# Patient Record
Sex: MALE | Race: WHITE | NOT HISPANIC OR LATINO | URBAN - METROPOLITAN AREA
[De-identification: names, ages, dates, MRNs, and addresses within clinical notes are randomized per-mention and may not be internally consistent; named-entity substitution may affect disease eponyms.]

---

## 2017-03-22 ENCOUNTER — APPOINTMENT (RX ONLY)
Dept: URBAN - METROPOLITAN AREA CLINIC 349 | Facility: CLINIC | Age: 57
Setting detail: DERMATOLOGY
End: 2017-03-22

## 2017-03-22 DIAGNOSIS — L29.89 OTHER PRURITUS: ICD-10-CM

## 2017-03-22 DIAGNOSIS — L259 CONTACT DERMATITIS AND OTHER ECZEMA, UNSPECIFIED CAUSE: ICD-10-CM

## 2017-03-22 DIAGNOSIS — L29.8 OTHER PRURITUS: ICD-10-CM

## 2017-03-22 PROBLEM — L30.8 OTHER SPECIFIED DERMATITIS: Status: ACTIVE | Noted: 2017-03-22

## 2017-03-22 PROCEDURE — ? TREATMENT REGIMEN

## 2017-03-22 PROCEDURE — ? INTRAMUSCULAR KENALOG

## 2017-03-22 PROCEDURE — 99202 OFFICE O/P NEW SF 15 MIN: CPT | Mod: 25

## 2017-03-22 PROCEDURE — 96372 THER/PROPH/DIAG INJ SC/IM: CPT

## 2017-03-22 PROCEDURE — ? PRESCRIPTION

## 2017-03-22 PROCEDURE — ? COUNSELING

## 2017-03-22 RX ORDER — TRIAMCINOLONE ACETONIDE 1 MG/G
CREAM TOPICAL BID
Qty: 1 | Refills: 1 | Status: ERX | COMMUNITY
Start: 2017-03-22

## 2017-03-22 RX ADMIN — TRIAMCINOLONE ACETONIDE: 1 CREAM TOPICAL at 18:11

## 2017-03-22 ASSESSMENT — LOCATION DETAILED DESCRIPTION DERM
LOCATION DETAILED: RIGHT BUTTOCK
LOCATION DETAILED: RIGHT LATERAL SUPERIOR CHEST
LOCATION DETAILED: LEFT ANTERIOR PROXIMAL UPPER ARM
LOCATION DETAILED: RIGHT CLAVICULAR SKIN
LOCATION DETAILED: LEFT ANTERIOR SHOULDER
LOCATION DETAILED: RIGHT ANTERIOR PROXIMAL UPPER ARM
LOCATION DETAILED: LEFT LATERAL SUPERIOR CHEST
LOCATION DETAILED: LEFT POSTERIOR SHOULDER
LOCATION DETAILED: RIGHT POSTERIOR SHOULDER
LOCATION DETAILED: RIGHT ANTERIOR SHOULDER

## 2017-03-22 ASSESSMENT — LOCATION SIMPLE DESCRIPTION DERM
LOCATION SIMPLE: LEFT SHOULDER
LOCATION SIMPLE: RIGHT BUTTOCK
LOCATION SIMPLE: CHEST
LOCATION SIMPLE: RIGHT UPPER ARM
LOCATION SIMPLE: RIGHT CLAVICULAR SKIN
LOCATION SIMPLE: RIGHT SHOULDER
LOCATION SIMPLE: LEFT UPPER ARM

## 2017-03-22 ASSESSMENT — LOCATION ZONE DERM
LOCATION ZONE: TRUNK
LOCATION ZONE: ARM

## 2017-03-22 NOTE — PROCEDURE: TREATMENT REGIMEN
Plan: Topicals will help
Detail Level: Detailed
Initiate Treatment: Aveeno skin relief cleanser for bathing, Aveeno doing relief moisturizer after bathing, TMC to apply to rash twice daily

## 2017-05-24 ENCOUNTER — APPOINTMENT (RX ONLY)
Dept: URBAN - METROPOLITAN AREA CLINIC 349 | Facility: CLINIC | Age: 57
Setting detail: DERMATOLOGY
End: 2017-05-24

## 2017-05-24 DIAGNOSIS — L259 CONTACT DERMATITIS AND OTHER ECZEMA, UNSPECIFIED CAUSE: ICD-10-CM | Status: RESOLVED

## 2017-05-24 PROBLEM — H91.90 UNSPECIFIED HEARING LOSS, UNSPECIFIED EAR: Status: ACTIVE | Noted: 2017-05-24

## 2017-05-24 PROBLEM — L30.8 OTHER SPECIFIED DERMATITIS: Status: ACTIVE | Noted: 2017-05-24

## 2017-05-24 PROBLEM — F41.9 ANXIETY DISORDER, UNSPECIFIED: Status: ACTIVE | Noted: 2017-05-24

## 2017-05-24 PROCEDURE — ? RECOMMENDATIONS

## 2017-05-24 PROCEDURE — 99213 OFFICE O/P EST LOW 20 MIN: CPT

## 2017-05-24 PROCEDURE — ? COUNSELING

## 2017-05-24 ASSESSMENT — LOCATION DETAILED DESCRIPTION DERM
LOCATION DETAILED: RIGHT VENTRAL PROXIMAL FOREARM
LOCATION DETAILED: LEFT VENTRAL PROXIMAL FOREARM

## 2017-05-24 ASSESSMENT — LOCATION ZONE DERM: LOCATION ZONE: ARM

## 2017-05-24 ASSESSMENT — LOCATION SIMPLE DESCRIPTION DERM
LOCATION SIMPLE: LEFT FOREARM
LOCATION SIMPLE: RIGHT FOREARM

## 2017-05-24 NOTE — PROCEDURE: RECOMMENDATIONS
Detail Level: Zone
Recommendations (Free Text): If flares again start Triamcinolone cream and use three times daily x 2 wks then as needed\\nCall if gets out of hand

## 2017-06-06 PROBLEM — F33.1 MODERATE EPISODE OF RECURRENT MAJOR DEPRESSIVE DISORDER (HCC): Status: ACTIVE | Noted: 2017-06-06

## 2017-09-06 PROBLEM — F33.40 RECURRENT MAJOR DEPRESSIVE DISORDER, IN REMISSION (HCC): Status: ACTIVE | Noted: 2017-09-06

## 2017-09-06 PROBLEM — F41.1 GENERALIZED ANXIETY DISORDER: Status: ACTIVE | Noted: 2017-09-06

## 2017-10-26 ENCOUNTER — TELEPHONE (OUTPATIENT)
Dept: NUTRITION | Age: 57
End: 2017-10-26

## 2017-10-30 ENCOUNTER — HOSPITAL ENCOUNTER (OUTPATIENT)
Dept: NUTRITION | Age: 57
Discharge: HOME | End: 2017-10-30
Attending: FAMILY MEDICINE
Payer: MEDICARE

## 2017-10-30 PROCEDURE — 97802 MEDICAL NUTRITION INDIV IN: CPT

## 2017-10-30 NOTE — PROGRESS NOTES
NUTRITION ASSESSMENT:    FOOD/NUTRITION HISTORY:   Pt eats 3 meals/day with snacks (fruit and sugar-free candies noted). Pt recalls making attempts to follow a diet that he believes will help control his diabetes. Diet recall reveals 75-100gm carbohydrates estimated intake/day. Diet appears low in calcium-containing choices, low in servings of fruits and vegetables, low in fiber,adequate in protein. Unable to determine kcal level d/t portions not noted on food recall. Diet appears low in heart-healthy fats. Pt dines out 3-4 days/week. Noted for grilled chicken, low to no-carbs, and salads. Pt drinks 24 oz water/day, 12oz sweetened tea/day, and 2 (12oz) cups of milk/day. Alcohol intake noted @ none. Pt has an exercise regime of walking for 45 minutes/day after eating in the morning. Pt appears able to obtain appropriate nutritional choices as desired. Support:  Pt is  and wife may provide some support. Pt states wife cooks meals for pt. Barriers:  Pt appears to restrict carbohydrates well below allowances. Pt may not have adequate nutrition education regarding diabetic diet guidelines. Motivations for change:  Pt states that he is concerned when he has low blood sugar moments (hypoglycemia) including during driving at times. Would like education to prevent episodes. Current Medical Diagnosis:   Hypoglycemia    History of Current Illness:   Pt is referred by Dr. Ellen Junior for nutrition counseling. Pt is covered by Medicare for 3 appts (3 hours total in first year). Pt reports his last episode of low blood sugar was about 6 days ago (reading BS 59). Reports low blood sugar symptoms most often when out on errands and occasionally when driving.     Nutritionally Pertinent Past Medical History:   Type II DM  Hypoglycemia (at age 55)  HTN  IBS  chronic pain  depression  anxiety  ADHD  constipation  diverticulitis  hypokalemia    Family history of diabetes (grandfather)    Sleep patterns: 7-8 hours (uses C-PAP)    Socioeconomic status/ current living conditions:  Pt is  and retired educator. Medical Procedures, Lab Results, Test Results:   Labs: (7/11/17)  HgA1c 6.0  Glucose 113  GFR >60    Nutritionally Relevant Medications:  Potassium  Losartan    Supplements/Vitamins/Herbs:   None noted. ANTHROPOMETRIC DATA:  Ht: 5'11 (1.803 m)  Wt: 226# per pt (102.7 kg)  BMI: 31.6 (Obesity Class I)  IBW: 172# (78.2kg)  Any recent wt loss or gain: None noted. Weight hx in EMR as below. Appearts to have gained 10# in past year. WT / BMI WEIGHT BODY MASS INDEX   12/13/2017 244 lb 35.01 kg/m2   12/6/2017 241 lb 34.58 kg/m2   11/28/2017 241 lb 34.58 kg/m2   9/20/2017 236 lb 33.86 kg/m2   8/24/2017 233 lb 33.43 kg/m2   7/10/2017 232 lb 33.29 kg/m2   3/7/2017 235 lb 33.72 kg/m2   11/9/2016 234 lb 33.58 kg/m2   9/16/2016 237 lb 34.01 kg/m2   5/9/2016 230 lb 33 kg/m2   3/18/2016 230 lb 33 kg/m2   3/8/2016 227 lb 6 oz 32.63 kg/m2     EER: 1541- 2054 kcal/day (15-20 kcal/kg actual BW) Obesity Class I  Carbohydrates: 188-250 gm/day (50% of kcal) or 60 gm/meal(3) with 30gm/snack(2-3)  Protein: 62-78gm/day (0.8-1gm/kg IBW) GFR >60  Fat: 65-72gm/day (33-36% of 1800 kcal) Heart- healthy fats emphasized with lists given. Fluids: 1.5-2 L/day (1 ml/kcal)    NUTRITION DIAGNOSIS:   Nutrition-related altered labs r/t eating patterns and medical history of diabetes as evidenced by pt's report of BS readings below 60 mg/dl, symptoms reported, and po intake patterns recorded (as above). NUTRITION INTERVENTION:  Appointment length: 60 minutes. Nutrition Education:  Purpose of nutrition education: To provide pt with diabetic diet and consistent carbohydrate education. To provide strategies to prevent hypoglycemia and education to treat episodes. Nutrition relationship in health/disease: Pt voices good understanding of complications of diabetes.   Recommended modifications: Recommended that pt include balanced macronutrient amounts in all meals and snacks to include adequate amount of carbohydrates (complex). Skill development:  · Provided pt with a meal plan and educated pt on how to use meal plan using models and measuring cups to demonstrate appropriate portion sizes. Gave kcal goals for meals and encouraged pt to track intake to meet goals and to relearn appropriate portion sizes for present activity level. Advised using portions as a guide and to rely on kcal level if needed when dining out. · Explained to pt how to use the meal planner sheets and the meal plan. Pt composed 3 sample meals in office using meal planner. Pt was attentive and asked appropriate questions. Expect that some attempts will be made to follow the meal plan- pt stated that he would attempt to use the meal planner sheets. · Reviewed guidelines to treat hypoglycemia. Although pt states that he has candies to treat hypoglycemia, he describes the foods that he uses to treat low blood sugar moments- usually as grilled meats of low carb choices. Again reminded pt that his meals that he recorded for appt often have minimal carbohydrates. Discussed complex carbohydrate choices to add in appropriate portion  · Showed pt [facility] MyPlate template to build a diabetic friendly plate. Nutrition Counseling: Motivational Interviewing:    Discussed pt's willingness to plan po intake more appropriately for a consistent carbohydrate diet. Pt keep voicing intentions to control carbohydrate intake throughout interview. Reminded pt that carbohydrate intake with meals and snacks can help provide more consistent blood sugar levels and avoid hypoglycemia. Pt states he can make needed changes. Self-monitoring:   · Advised pt that goals of this program are to educate pt an appropriate intake and nutrition planning for weight management, to address barriers to wt loss, and to develop self- monitoring, self-regulating behaviors for life-long management skills.  Advised pt that lifestyle changes will usually be required. Pt agreed to plan meals and snacks for a time to self-monitor intake compared to plans. Cognitive restructuring:   · Discussed events surrounding hypoglycemic moments. Advised pt to investigate circumstances that precipitate events. After discussion, it appears events happen when pt is out running errands and has more activity than usual- also may not meet his goal of eating every 3 hours. · Discussed planning ahead, setting reminders on phone, and having snacks on time when out running errands. Goal Setting: Pt agreed to the following goals. Goal: Pt will choose appropriate macronutrient amounts for all meals and snacks. Plan: Pt will use My Meal Plan and Steps to Your Health along with other materials given as guides and support to structure eating in timing, food choices, macronutrient amounts, and portion sizes for goal stated (as above). Goal: Pt will plan ahead to avoid hypoglycemia in the typical scenarios that he experiences them. Plan: Pt will bring appropriate snacks with him when he runs errands to have foods available on time (every 3 hours) to avoid hypoglycemic moments. Materials Given:  My Meal Plan- 2000 kcal/day; consistent carbohydrate  Steps to Your Health- general food guide   Goal Setting- pt selected action plan form  Fiber Basics   Fat Basics   Hypoglycemia Handout- education and treatment guidelines  Diabetic-Friendly Snack Ideas (~200 kcals)    MONITORING AND EVALUATION DETAILS:  Follow up appt: 11/13/17   Follow-up Monitoring Plans: Will assess and address any barriers to or success with plans. Will review po intake patterns and food choices to compare with diabetic meal plan.     Johana Wilson, MS, RD,CSSD, LD  W: [phone]  C: [phone]

## 2017-11-13 ENCOUNTER — HOSPITAL ENCOUNTER (OUTPATIENT)
Dept: NUTRITION | Age: 57
Discharge: HOME | End: 2017-11-13
Attending: FAMILY MEDICINE
Payer: MEDICARE

## 2017-11-13 PROCEDURE — 97803 MED NUTRITION INDIV SUBSEQ: CPT

## 2017-11-13 NOTE — PROGRESS NOTES
Problem: Nutrition Deficit   Goal: *Optimize nutritional status     NUTRITION FOLLOW UP:    ASSESSMENT of Interventions:   Goal: Pt will choose appropriate macronutrient amounts for all meals and snacks. Plan: Pt will use My Meal Plan and Steps to Your Health along with other materials given as guides and support to structure eating in timing, food choices, macronutrient amounts, and portion sizes for goal stated (as above). Progress: Pt reports some compliance, even though he voices comprehension. Some snacks, including emergency snacks for hypoglycemia appear deficient in CHO. States he uses an Ap on his phone (ADA designed) for scanning products in grocery store are good choices for diabetes (low added sugar, high fiber, for ex). Goal: Pt will plan ahead to avoid hypoglycemia in the typical scenarios that he experiences them. Plan: Pt will bring appropriate snacks with him when he runs errands to have foods available on time (every 3 hours) to avoid hypoglycemic moments. Progress: Pt reports 2 episodes on 2 weeks. Reports carrying whole wheat and peanut butter crackers    NUTRITION DIAGNOSIS:   Nutrition-related altered labs r/t eating patterns and medical history of diabetes as evidenced by pt's report of BS readings below 60 mg/dl, symptoms reported, and po intake patterns recorded (as above). NUTRITION INTERVENTION:  Appointment length: 60 minutes. Nutrition Education:  Recommended modifications: Recommended that pt include balanced macronutrient amounts in all meals and snacks to include adequate amount of carbohydrates (complex). Skill development:  · Reviewed guidelines to treat hypoglycemia. Although pt states that he has candies to treat hypoglycemia, he describes the foods that he uses to treat low blood sugar moments- usually as grilled meats of low carb choices. Again reminded pt that his meals that he recorded for appt often have minimal carbohydrates. Discussed complex carbohydrate choices to add in appropriate portions. · Reviewed again po intake of 30 gm CHO in simple, easy to digest form to address any hypoglycemic moments (verses pt's recall of purchasing grilled chicken nuggets when sensing symptoms). Nutrition Counseling: Motivational Interviewing:     Discussed pt's willingness to plan po intake more appropriately for a consistent carbohydrate diet. Pt keep voicing intentions to control carbohydrate intake throughout interview. Reminded pt that carbohydrate intake with meals and snacks can help provide more consistent blood sugar levels and avoid hypoglycemia. Pt states he can make needed changes. Cognitive restructuring:   · Discussed events surrounding hypoglycemic moments. Advised pt to investigate circumstances that precipitate events. After discussion, it appears events happen when pt is out running errands and has more activity than usual- also may not meet his goal of eating every 3 hours. · Discussed planning ahead, setting reminders on phone, and having snacks on time when out running errands. Goal Setting: Pt agreed to the following goals. Goal: Pt will choose appropriate macronutrient amounts for all meals and snacks. Plan: Pt will use My Meal Plan and Steps to Your Health along with other materials given as guides and support to structure eating in timing, food choices, macronutrient amounts, and portion sizes for goal stated (as above). Goal: Pt will plan ahead to avoid hypoglycemia in the typical scenarios that he experiences them. Plan: Pt will bring appropriate snacks with him when he runs errands to have foods available on time (every 3 hours) to avoid hypoglycemic moments. MONITORING AND EVALUATION DETAILS:  Follow up appt: n/a  Follow-up Monitoring Plans: Pt to call with questions. Advised pt to follow-up with referring physician.     Karina Luna, MS, RD,CSSD, LD  W: [phone]  C: [phone]

## 2017-11-28 PROBLEM — E16.2 HYPOGLYCEMIA: Status: ACTIVE | Noted: 2017-11-28

## 2017-12-01 ENCOUNTER — HOSPITAL ENCOUNTER (OUTPATIENT)
Dept: LAB | Age: 57
Discharge: HOME | End: 2017-12-01
Attending: FAMILY MEDICINE
Payer: MEDICARE

## 2017-12-01 ENCOUNTER — HOSPITAL ENCOUNTER (OUTPATIENT)
Dept: CT IMAGING | Age: 57
Discharge: HOME | End: 2017-12-01
Attending: FAMILY MEDICINE
Payer: MEDICARE

## 2017-12-01 DIAGNOSIS — R10.10 PAIN OF UPPER ABDOMEN: ICD-10-CM

## 2017-12-01 LAB — CREAT SERPL-MCNC: 1.15 MG/DL (ref 0.8–1.5)

## 2017-12-01 PROCEDURE — 74011000258 HC RX REV CODE- 258: Performed by: FAMILY MEDICINE

## 2017-12-01 PROCEDURE — 82565 ASSAY OF CREATININE: CPT | Performed by: FAMILY MEDICINE

## 2017-12-01 PROCEDURE — 74177 CT ABD & PELVIS W/CONTRAST: CPT

## 2017-12-01 PROCEDURE — 74011636320 HC RX REV CODE- 636/320: Performed by: FAMILY MEDICINE

## 2017-12-01 RX ORDER — SODIUM CHLORIDE 0.9 % (FLUSH) 0.9 %
10 SYRINGE (ML) INJECTION
Status: COMPLETED | OUTPATIENT
Start: 2017-12-01 | End: 2017-12-01

## 2017-12-01 RX ADMIN — DIATRIZOATE MEGLUMINE AND DIATRIZOATE SODIUM 15 ML: 660; 100 LIQUID ORAL; RECTAL at 16:06

## 2017-12-01 RX ADMIN — SODIUM CHLORIDE 100 ML: 900 INJECTION, SOLUTION INTRAVENOUS at 16:06

## 2017-12-01 RX ADMIN — Medication 10 ML: at 16:06

## 2017-12-01 RX ADMIN — IOPAMIDOL 100 ML: 755 INJECTION, SOLUTION INTRAVENOUS at 16:06

## 2018-03-21 ENCOUNTER — HOSPITAL ENCOUNTER (OUTPATIENT)
Dept: LAB | Age: 58
Discharge: HOME | End: 2018-03-21

## 2018-03-21 PROCEDURE — 88305 TISSUE EXAM BY PATHOLOGIST: CPT | Performed by: FAMILY MEDICINE

## 2018-06-12 ENCOUNTER — HOSPITAL ENCOUNTER (OUTPATIENT)
Dept: ULTRASOUND IMAGING | Age: 58
Discharge: HOME | End: 2018-06-12
Attending: FAMILY MEDICINE
Payer: MEDICARE

## 2018-06-12 DIAGNOSIS — R10.32 BILATERAL GROIN PAIN: ICD-10-CM

## 2018-06-12 DIAGNOSIS — R10.31 BILATERAL GROIN PAIN: ICD-10-CM

## 2018-06-12 PROCEDURE — 76870 US EXAM SCROTUM: CPT

## 2018-06-21 PROBLEM — E66.01 SEVERE OBESITY (BMI 35.0-39.9): Status: ACTIVE | Noted: 2018-06-21

## 2018-07-12 PROBLEM — K40.90 LEFT INGUINAL HERNIA: Status: ACTIVE | Noted: 2018-07-12

## 2018-10-29 ENCOUNTER — HOSPITAL ENCOUNTER (OUTPATIENT)
Dept: CT IMAGING | Age: 58
Discharge: HOME | End: 2018-10-29
Attending: FAMILY MEDICINE
Payer: MEDICARE

## 2018-10-29 DIAGNOSIS — M79.89 LEG SWELLING: ICD-10-CM

## 2018-10-29 DIAGNOSIS — N28.89 OTHER SPECIFIED DISORDERS OF KIDNEY AND URETER: ICD-10-CM

## 2018-10-29 PROCEDURE — 74176 CT ABD & PELVIS W/O CONTRAST: CPT

## 2018-11-01 ENCOUNTER — HOSPITAL ENCOUNTER (OUTPATIENT)
Dept: ULTRASOUND IMAGING | Age: 58
Discharge: HOME | End: 2018-11-01
Attending: FAMILY MEDICINE
Payer: MEDICARE

## 2018-11-01 DIAGNOSIS — M79.89 LEFT LEG SWELLING: ICD-10-CM

## 2018-11-01 PROCEDURE — 93971 EXTREMITY STUDY: CPT

## 2019-08-29 NOTE — TELEPHONE ENCOUNTER
Nutrition Counseling:  Pt referred by Dr. Kristopher Cunha on 8/24/17. Pt called to check on referral r/t hypoglycemia. Referral is in system but was not apparent. Called pot today and set appt for 10/30/17. Nutrition appt information emailed to pt.     Erick Piña MS, [address], [address] [place] [address] Rd, LD  W: [phone]  C: [phone]
No

## 2019-12-04 PROBLEM — G56.02 CARPAL TUNNEL SYNDROME OF LEFT WRIST: Status: ACTIVE | Noted: 2019-12-04

## 2019-12-04 PROBLEM — G56.22 ULNAR NEUROPATHY AT ELBOW, LEFT: Status: ACTIVE | Noted: 2019-12-04

## 2019-12-04 PROBLEM — R20.0 NUMBNESS AND TINGLING IN LEFT HAND: Status: ACTIVE | Noted: 2019-12-04

## 2019-12-04 PROBLEM — M79.602 PAIN OF LEFT UPPER EXTREMITY: Status: ACTIVE | Noted: 2019-12-04

## 2019-12-04 PROBLEM — R20.2 NUMBNESS AND TINGLING IN LEFT HAND: Status: ACTIVE | Noted: 2019-12-04

## 2020-01-07 PROBLEM — T81.89XA SUTURE REACTION: Status: ACTIVE | Noted: 2020-01-07

## 2020-01-21 ENCOUNTER — HOSPITAL ENCOUNTER (OUTPATIENT)
Dept: SURGERY | Age: 60
Discharge: HOME | End: 2020-01-21
Payer: MEDICARE

## 2020-01-21 VITALS
TEMPERATURE: 97.8 F | HEART RATE: 103 BPM | RESPIRATION RATE: 17 BRPM | BODY MASS INDEX: 34.36 KG/M2 | SYSTOLIC BLOOD PRESSURE: 159 MMHG | OXYGEN SATURATION: 97 % | WEIGHT: 232 LBS | DIASTOLIC BLOOD PRESSURE: 88 MMHG | HEIGHT: 69 IN

## 2020-01-21 LAB
APPEARANCE UR: CLEAR
BILIRUB UR QL: NEGATIVE
COLOR UR: YELLOW
CREAT SERPL-MCNC: 1.1 MG/DL (ref 0.8–1.5)
GLUCOSE UR STRIP.AUTO-MCNC: NEGATIVE MG/DL
HGB BLD-MCNC: 15.4 G/DL (ref 13.6–17.2)
HGB UR QL STRIP: NEGATIVE
KETONES UR QL STRIP.AUTO: NEGATIVE MG/DL
LEUKOCYTE ESTERASE UR QL STRIP.AUTO: NEGATIVE
NITRITE UR QL STRIP.AUTO: NEGATIVE
PH UR STRIP: 7 [PH] (ref 5–9)
POTASSIUM SERPL-SCNC: 4.1 MMOL/L (ref 3.5–5.1)
PROT UR STRIP-MCNC: NEGATIVE MG/DL
SP GR UR REFRACTOMETRY: 1.01 (ref 1–1.02)
UROBILINOGEN UR QL STRIP.AUTO: 0.2 EU/DL (ref 0.2–1)

## 2020-01-21 PROCEDURE — 81003 URINALYSIS AUTO W/O SCOPE: CPT

## 2020-01-21 PROCEDURE — 84132 ASSAY OF SERUM POTASSIUM: CPT

## 2020-01-21 PROCEDURE — 93005 ELECTROCARDIOGRAM TRACING: CPT | Performed by: ANESTHESIOLOGY

## 2020-01-21 PROCEDURE — 82565 ASSAY OF CREATININE: CPT

## 2020-01-21 PROCEDURE — 85018 HEMOGLOBIN: CPT

## 2020-01-21 NOTE — PERIOP NOTES
PLEASE CONTINUE TAKING ALL PRESCRIPTION MEDICATIONS UP TO THE DAY OF SURGERY UNLESS OTHERWISE DIRECTED BELOW. DISCONTINUE all vitamins and supplements 7 days prior to surgery. DISCONTINUE Non-Steriodal Anti-Inflammatory (NSAIDS) such as Advil and Aleve 5 days prior to surgery. Home Medications to take  the day of surgery    finasteride/ Proscar  Silodosin/ Rapaflo           Home Medications   to Hold   All vitamins and supplements        Comments   Bring CPAP (leave in car until admitted to room)             Please do not bring home medications with you on the day of surgery unless otherwise directed by your nurse. If you are instructed to bring home medications, please give them to your nurse as they will be administered by the nursing staff. If you have any questions, please call [facility] [phone] or [facility] [phone].

## 2020-01-21 NOTE — PERIOP NOTES
Patient confirms name and . Order to obtain consent found in EHR and matches case posting. Type 2 surgery,  assessment complete. Labs per surgeon: UA  Orders per anesthesia protocol: hgb, potassium, creatinine & EKG today---   T&S on dos signed and held for Preop    EKG: today \"NSR\"    Medication list visualized today. Hibiclens and instructions given per hospital policy. Patient provided with and instructed on educational handouts including Guide to Surgery, Pain Management, Hand Hygiene, Blood Transfusion Education, and [place] [facility] Brochure. Patient answered medical/surgical history questions at their best of ability. All prior to admission medications documented in [facility]. Patient instructed to continue previous medications as prescribed prior to surgery and to take the following medications the day of surgery according to anesthesia guidelines with a small sip of water: proscar and rapaflo . Patient instructed to hold all vitamins 7 days prior to surgery and NSAIDS 5 days prior to surgery, patient verbalized understanding. Medications to be held: none    Patient instructed on the following:  Arrive at [facility], time of arrival to be called the day before by 1700  NPO after midnight including gum, mints, and ice chips. Responsible adult must drive patient to the hospital, stay during surgery, and patient will require supervision 24 hours after anesthesia. Use hibiclens in shower the night before surgery and on the morning of surgery. Hibiclens provided to patient with verbal instructions and handout  Leave all valuables (money and jewelry) at home but bring insurance card and ID on DOS. Do not wear make-up, nail polish, lotions, cologne, perfumes, powders, or oil on skin. Patient teach back successful and patient demonstrates knowledge of instruction.

## 2020-01-21 NOTE — PERIOP NOTES
Recent Results (from the past 12 hour(s))   URINALYSIS W/ RFLX MICROSCOPIC    Collection Time: 01/21/20  9:37 AM   Result Value Ref Range    Color YELLOW      Appearance CLEAR      Specific gravity 1.008 1.001 - 1.023      pH (UA) 7.0 5.0 - 9.0      Protein NEGATIVE  NEG mg/dL    Glucose NEGATIVE  mg/dL    Ketone NEGATIVE  NEG mg/dL    Bilirubin NEGATIVE  NEG      Blood NEGATIVE  NEG      Urobilinogen 0.2 0.2 - 1.0 EU/dL    Nitrites NEGATIVE  NEG      Leukocyte Esterase NEGATIVE  NEG     HEMOGLOBIN    Collection Time: 01/21/20  9:43 AM   Result Value Ref Range    HGB 15.4 13.6 - 17.2 g/dL   CREATININE    Collection Time: 01/21/20  9:43 AM   Result Value Ref Range    Creatinine 1.10 0.8 - 1.5 MG/DL   POTASSIUM    Collection Time: 01/21/20  9:43 AM   Result Value Ref Range    Potassium 4.1 3.5 - 5.1 mmol/L   EKG, 12 LEAD, INITIAL    Collection Time: 01/21/20 10:51 AM   Result Value Ref Range    Ventricular Rate 90 BPM    Atrial Rate 90 BPM    P-R Interval 160 ms    QRS Duration 92 ms    Q-T Interval 366 ms    QTC Calculation (Bezet) 447 ms    Calculated P Axis 53 degrees    Calculated R Axis -16 degrees    Calculated T Axis 50 degrees    Diagnosis       Normal sinus rhythm  Normal ECG  No previous ECGs available      Lab results reviewed. WDL.

## 2020-01-22 LAB
ATRIAL RATE: 90 BPM
CALCULATED P AXIS, ECG09: 53 DEGREES
CALCULATED R AXIS, ECG10: -16 DEGREES
CALCULATED T AXIS, ECG11: 50 DEGREES
DIAGNOSIS, 93000: NORMAL
P-R INTERVAL, ECG05: 160 MS
Q-T INTERVAL, ECG07: 366 MS
QRS DURATION, ECG06: 92 MS
QTC CALCULATION (BEZET), ECG08: 447 MS
VENTRICULAR RATE, ECG03: 90 BPM

## 2020-01-27 ENCOUNTER — ANESTHESIA EVENT (OUTPATIENT)
Dept: SURGERY | Age: 60
End: 2020-01-27
Payer: MEDICARE

## 2020-01-28 ENCOUNTER — ANESTHESIA (OUTPATIENT)
Dept: SURGERY | Age: 60
End: 2020-01-28
Payer: MEDICARE

## 2020-01-28 ENCOUNTER — HOSPITAL ENCOUNTER (OUTPATIENT)
Age: 60
Discharge: HOME | End: 2020-01-29
Attending: UROLOGY | Admitting: UROLOGY
Payer: MEDICARE

## 2020-01-28 PROBLEM — N40.0 BPH (BENIGN PROSTATIC HYPERPLASIA): Status: ACTIVE | Noted: 2020-01-28

## 2020-01-28 LAB
ABO + RH BLD: NORMAL
BLOOD GROUP ANTIBODIES SERPL: NORMAL
EST. AVERAGE GLUCOSE BLD GHB EST-MCNC: 123 MG/DL
GLUCOSE BLD STRIP.AUTO-MCNC: 113 MG/DL (ref 65–100)
GLUCOSE BLD STRIP.AUTO-MCNC: 125 MG/DL (ref 65–100)
GLUCOSE BLD STRIP.AUTO-MCNC: 160 MG/DL (ref 65–100)
HBA1C MFR BLD: 5.9 % (ref 4.8–6)
SPECIMEN EXP DATE BLD: NORMAL

## 2020-01-28 PROCEDURE — 77030018846 HC SOL IRR STRL H20 ICUM -A: Performed by: UROLOGY

## 2020-01-28 PROCEDURE — 74011250636 HC RX REV CODE- 250/636: Performed by: ANESTHESIOLOGY

## 2020-01-28 PROCEDURE — 36415 COLL VENOUS BLD VENIPUNCTURE: CPT

## 2020-01-28 PROCEDURE — 74011250636 HC RX REV CODE- 250/636: Performed by: UROLOGY

## 2020-01-28 PROCEDURE — 74011250637 HC RX REV CODE- 250/637: Performed by: UROLOGY

## 2020-01-28 PROCEDURE — 74011000250 HC RX REV CODE- 250: Performed by: NURSE ANESTHETIST, CERTIFIED REGISTERED

## 2020-01-28 PROCEDURE — 77030010509 HC AIRWY LMA MSK TELE -A: Performed by: ANESTHESIOLOGY

## 2020-01-28 PROCEDURE — 76210000006 HC OR PH I REC 0.5 TO 1 HR: Performed by: UROLOGY

## 2020-01-28 PROCEDURE — 88305 TISSUE EXAM BY PATHOLOGIST: CPT

## 2020-01-28 PROCEDURE — 83036 HEMOGLOBIN GLYCOSYLATED A1C: CPT

## 2020-01-28 PROCEDURE — 76060000033 HC ANESTHESIA 1 TO 1.5 HR: Performed by: UROLOGY

## 2020-01-28 PROCEDURE — 74011250637 HC RX REV CODE- 250/637: Performed by: ANESTHESIOLOGY

## 2020-01-28 PROCEDURE — 77030005206: Performed by: UROLOGY

## 2020-01-28 PROCEDURE — 86900 BLOOD TYPING SEROLOGIC ABO: CPT

## 2020-01-28 PROCEDURE — 74011250636 HC RX REV CODE- 250/636: Performed by: NURSE ANESTHETIST, CERTIFIED REGISTERED

## 2020-01-28 PROCEDURE — 76010000161 HC OR TIME 1 TO 1.5 HR INTENSV-TIER 1: Performed by: UROLOGY

## 2020-01-28 PROCEDURE — 99218 HC RM OBSERVATION: CPT

## 2020-01-28 PROCEDURE — 77030019908 HC STETH ESOPH SIMS -A: Performed by: ANESTHESIOLOGY

## 2020-01-28 PROCEDURE — 77030029290 HC ELECTRD LP CUT OCOA -F: Performed by: UROLOGY

## 2020-01-28 PROCEDURE — 82962 GLUCOSE BLOOD TEST: CPT

## 2020-01-28 PROCEDURE — 77030005546 HC CATH URETH FOL 3W BARD -A: Performed by: UROLOGY

## 2020-01-28 PROCEDURE — 77030040922 HC BLNKT HYPOTHRM STRY -A: Performed by: ANESTHESIOLOGY

## 2020-01-28 RX ORDER — ACETAMINOPHEN 325 MG/1
650 TABLET ORAL
Status: DISCONTINUED | OUTPATIENT
Start: 2020-01-28 | End: 2020-01-29 | Stop reason: HOSPADM

## 2020-01-28 RX ORDER — MORPHINE SULFATE 2 MG/ML
2 INJECTION, SOLUTION INTRAMUSCULAR; INTRAVENOUS
Status: DISCONTINUED | OUTPATIENT
Start: 2020-01-28 | End: 2020-01-29 | Stop reason: HOSPADM

## 2020-01-28 RX ORDER — TAMSULOSIN HYDROCHLORIDE 0.4 MG/1
0.4 CAPSULE ORAL DAILY
Status: DISCONTINUED | OUTPATIENT
Start: 2020-01-29 | End: 2020-01-29 | Stop reason: HOSPADM

## 2020-01-28 RX ORDER — MIDAZOLAM HYDROCHLORIDE 1 MG/ML
2 INJECTION, SOLUTION INTRAMUSCULAR; INTRAVENOUS ONCE
Status: DISCONTINUED | OUTPATIENT
Start: 2020-01-28 | End: 2020-01-28 | Stop reason: HOSPADM

## 2020-01-28 RX ORDER — LUBIPROSTONE 24 UG/1
24 CAPSULE, GELATIN COATED ORAL 2 TIMES DAILY WITH MEALS
Status: DISCONTINUED | OUTPATIENT
Start: 2020-01-28 | End: 2020-01-29 | Stop reason: HOSPADM

## 2020-01-28 RX ORDER — POTASSIUM CHLORIDE 750 MG/1
10 TABLET, EXTENDED RELEASE ORAL DAILY
Status: DISCONTINUED | OUTPATIENT
Start: 2020-01-29 | End: 2020-01-29 | Stop reason: HOSPADM

## 2020-01-28 RX ORDER — PROPOFOL 10 MG/ML
INJECTION, EMULSION INTRAVENOUS AS NEEDED
Status: DISCONTINUED | OUTPATIENT
Start: 2020-01-28 | End: 2020-01-28 | Stop reason: HOSPADM

## 2020-01-28 RX ORDER — INSULIN LISPRO 100 [IU]/ML
INJECTION, SOLUTION INTRAVENOUS; SUBCUTANEOUS
Status: DISCONTINUED | OUTPATIENT
Start: 2020-01-28 | End: 2020-01-29

## 2020-01-28 RX ORDER — FENTANYL CITRATE 50 UG/ML
INJECTION, SOLUTION INTRAMUSCULAR; INTRAVENOUS AS NEEDED
Status: DISCONTINUED | OUTPATIENT
Start: 2020-01-28 | End: 2020-01-28 | Stop reason: HOSPADM

## 2020-01-28 RX ORDER — FINASTERIDE 5 MG/1
5 TABLET, FILM COATED ORAL DAILY
Status: DISCONTINUED | OUTPATIENT
Start: 2020-01-29 | End: 2020-01-29 | Stop reason: HOSPADM

## 2020-01-28 RX ORDER — SODIUM CHLORIDE 9 MG/ML
75 INJECTION, SOLUTION INTRAVENOUS CONTINUOUS
Status: DISCONTINUED | OUTPATIENT
Start: 2020-01-28 | End: 2020-01-29

## 2020-01-28 RX ORDER — ONDANSETRON 2 MG/ML
4 INJECTION INTRAMUSCULAR; INTRAVENOUS
Status: DISCONTINUED | OUTPATIENT
Start: 2020-01-28 | End: 2020-01-29 | Stop reason: HOSPADM

## 2020-01-28 RX ORDER — ATROPA BELLADONNA AND OPIUM 16.2; 6 MG/1; MG/1
1 SUPPOSITORY RECTAL
Status: DISCONTINUED | OUTPATIENT
Start: 2020-01-28 | End: 2020-01-29 | Stop reason: HOSPADM

## 2020-01-28 RX ORDER — CEFAZOLIN SODIUM/WATER 2 G/20 ML
2 SYRINGE (ML) INTRAVENOUS EVERY 8 HOURS
Status: DISCONTINUED | OUTPATIENT
Start: 2020-01-28 | End: 2020-01-29 | Stop reason: HOSPADM

## 2020-01-28 RX ORDER — SODIUM CHLORIDE 0.9 % (FLUSH) 0.9 %
5-40 SYRINGE (ML) INJECTION AS NEEDED
Status: DISCONTINUED | OUTPATIENT
Start: 2020-01-28 | End: 2020-01-29 | Stop reason: HOSPADM

## 2020-01-28 RX ORDER — HYOSCYAMINE SULFATE 0.12 MG/1
0.12 TABLET SUBLINGUAL
Status: DISCONTINUED | OUTPATIENT
Start: 2020-01-28 | End: 2020-01-29 | Stop reason: HOSPADM

## 2020-01-28 RX ORDER — SODIUM CHLORIDE, SODIUM LACTATE, POTASSIUM CHLORIDE, CALCIUM CHLORIDE 600; 310; 30; 20 MG/100ML; MG/100ML; MG/100ML; MG/100ML
100 INJECTION, SOLUTION INTRAVENOUS CONTINUOUS
Status: DISCONTINUED | OUTPATIENT
Start: 2020-01-28 | End: 2020-01-28 | Stop reason: HOSPADM

## 2020-01-28 RX ORDER — FENTANYL CITRATE 50 UG/ML
100 INJECTION, SOLUTION INTRAMUSCULAR; INTRAVENOUS ONCE
Status: DISCONTINUED | OUTPATIENT
Start: 2020-01-28 | End: 2020-01-28 | Stop reason: HOSPADM

## 2020-01-28 RX ORDER — ONDANSETRON 2 MG/ML
INJECTION INTRAMUSCULAR; INTRAVENOUS AS NEEDED
Status: DISCONTINUED | OUTPATIENT
Start: 2020-01-28 | End: 2020-01-28 | Stop reason: HOSPADM

## 2020-01-28 RX ORDER — CEFAZOLIN SODIUM/WATER 2 G/20 ML
2 SYRINGE (ML) INTRAVENOUS
Status: COMPLETED | OUTPATIENT
Start: 2020-01-28 | End: 2020-01-28

## 2020-01-28 RX ORDER — DEXAMETHASONE SODIUM PHOSPHATE 4 MG/ML
INJECTION, SOLUTION INTRA-ARTICULAR; INTRALESIONAL; INTRAMUSCULAR; INTRAVENOUS; SOFT TISSUE AS NEEDED
Status: DISCONTINUED | OUTPATIENT
Start: 2020-01-28 | End: 2020-01-28 | Stop reason: HOSPADM

## 2020-01-28 RX ORDER — HYDROMORPHONE HYDROCHLORIDE 2 MG/ML
0.5 INJECTION, SOLUTION INTRAMUSCULAR; INTRAVENOUS; SUBCUTANEOUS
Status: DISCONTINUED | OUTPATIENT
Start: 2020-01-28 | End: 2020-01-28 | Stop reason: HOSPADM

## 2020-01-28 RX ORDER — MIDAZOLAM HYDROCHLORIDE 1 MG/ML
2 INJECTION, SOLUTION INTRAMUSCULAR; INTRAVENOUS
Status: COMPLETED | OUTPATIENT
Start: 2020-01-28 | End: 2020-01-28

## 2020-01-28 RX ORDER — SILODOSIN 8 MG/1
8 CAPSULE ORAL
Status: DISCONTINUED | OUTPATIENT
Start: 2020-01-29 | End: 2020-01-28

## 2020-01-28 RX ORDER — OXYCODONE HYDROCHLORIDE 5 MG/1
5 TABLET ORAL
Status: DISCONTINUED | OUTPATIENT
Start: 2020-01-28 | End: 2020-01-29 | Stop reason: HOSPADM

## 2020-01-28 RX ORDER — SODIUM CHLORIDE 0.9 % (FLUSH) 0.9 %
5-40 SYRINGE (ML) INJECTION EVERY 8 HOURS
Status: DISCONTINUED | OUTPATIENT
Start: 2020-01-28 | End: 2020-01-29 | Stop reason: HOSPADM

## 2020-01-28 RX ORDER — OXYCODONE HYDROCHLORIDE 5 MG/1
5 TABLET ORAL
Status: COMPLETED | OUTPATIENT
Start: 2020-01-28 | End: 2020-01-28

## 2020-01-28 RX ORDER — NORTRIPTYLINE HYDROCHLORIDE 25 MG/1
25 CAPSULE ORAL
Status: DISCONTINUED | OUTPATIENT
Start: 2020-01-28 | End: 2020-01-29 | Stop reason: HOSPADM

## 2020-01-28 RX ORDER — LIDOCAINE HYDROCHLORIDE 20 MG/ML
INJECTION, SOLUTION EPIDURAL; INFILTRATION; INTRACAUDAL; PERINEURAL AS NEEDED
Status: DISCONTINUED | OUTPATIENT
Start: 2020-01-28 | End: 2020-01-28 | Stop reason: HOSPADM

## 2020-01-28 RX ORDER — NALOXONE HYDROCHLORIDE 0.4 MG/ML
0.4 INJECTION, SOLUTION INTRAMUSCULAR; INTRAVENOUS; SUBCUTANEOUS AS NEEDED
Status: DISCONTINUED | OUTPATIENT
Start: 2020-01-28 | End: 2020-01-29 | Stop reason: HOSPADM

## 2020-01-28 RX ORDER — NALOXONE HYDROCHLORIDE 0.4 MG/ML
0.04 INJECTION, SOLUTION INTRAMUSCULAR; INTRAVENOUS; SUBCUTANEOUS
Status: DISCONTINUED | OUTPATIENT
Start: 2020-01-28 | End: 2020-01-28 | Stop reason: HOSPADM

## 2020-01-28 RX ORDER — AMOXICILLIN 250 MG
1 CAPSULE ORAL
Status: DISCONTINUED | OUTPATIENT
Start: 2020-01-28 | End: 2020-01-29 | Stop reason: HOSPADM

## 2020-01-28 RX ORDER — LIDOCAINE HYDROCHLORIDE 10 MG/ML
0.1 INJECTION INFILTRATION; PERINEURAL AS NEEDED
Status: DISCONTINUED | OUTPATIENT
Start: 2020-01-28 | End: 2020-01-28 | Stop reason: HOSPADM

## 2020-01-28 RX ADMIN — Medication 10 ML: at 14:52

## 2020-01-28 RX ADMIN — NORTRIPTYLINE HYDROCHLORIDE 25 MG: 25 CAPSULE ORAL at 21:40

## 2020-01-28 RX ADMIN — FENTANYL CITRATE 25 MCG: 50 INJECTION INTRAMUSCULAR; INTRAVENOUS at 08:47

## 2020-01-28 RX ADMIN — Medication 2 G: at 17:50

## 2020-01-28 RX ADMIN — Medication 10 ML: at 23:33

## 2020-01-28 RX ADMIN — LIDOCAINE HYDROCHLORIDE 100 MG: 20 INJECTION, SOLUTION EPIDURAL; INFILTRATION; INTRACAUDAL; PERINEURAL at 07:43

## 2020-01-28 RX ADMIN — MIDAZOLAM 2 MG: 1 INJECTION INTRAMUSCULAR; INTRAVENOUS at 07:31

## 2020-01-28 RX ADMIN — SODIUM CHLORIDE, SODIUM LACTATE, POTASSIUM CHLORIDE, AND CALCIUM CHLORIDE: 600; 310; 30; 20 INJECTION, SOLUTION INTRAVENOUS at 07:38

## 2020-01-28 RX ADMIN — DEXAMETHASONE SODIUM PHOSPHATE 4 MG: 4 INJECTION, SOLUTION INTRAMUSCULAR; INTRAVENOUS at 07:56

## 2020-01-28 RX ADMIN — PROPOFOL 200 MG: 10 INJECTION, EMULSION INTRAVENOUS at 07:43

## 2020-01-28 RX ADMIN — ACETAMINOPHEN 650 MG: 325 TABLET, FILM COATED ORAL at 15:24

## 2020-01-28 RX ADMIN — OXYCODONE HYDROCHLORIDE 5 MG: 5 TABLET ORAL at 09:57

## 2020-01-28 RX ADMIN — SODIUM CHLORIDE, SODIUM LACTATE, POTASSIUM CHLORIDE, AND CALCIUM CHLORIDE: 600; 310; 30; 20 INJECTION, SOLUTION INTRAVENOUS at 08:34

## 2020-01-28 RX ADMIN — Medication 2 G: at 07:50

## 2020-01-28 RX ADMIN — SODIUM CHLORIDE 75 ML/HR: 900 INJECTION, SOLUTION INTRAVENOUS at 14:51

## 2020-01-28 RX ADMIN — LUBIPROSTONE 24 MCG: 24 CAPSULE, GELATIN COATED ORAL at 22:44

## 2020-01-28 RX ADMIN — FENTANYL CITRATE 25 MCG: 50 INJECTION INTRAMUSCULAR; INTRAVENOUS at 08:19

## 2020-01-28 RX ADMIN — FENTANYL CITRATE 50 MCG: 50 INJECTION INTRAMUSCULAR; INTRAVENOUS at 08:00

## 2020-01-28 RX ADMIN — Medication 2 G: at 23:33

## 2020-01-28 RX ADMIN — ONDANSETRON 4 MG: 2 INJECTION INTRAMUSCULAR; INTRAVENOUS at 07:56

## 2020-01-28 NOTE — PROGRESS NOTES
's pre-op visit and prayer with patient as requested.     Aislinn Jackson MDiv, BS  Board Certified

## 2020-01-28 NOTE — ANESTHESIA PREPROCEDURE EVALUATION
Relevant Problems   No relevant active problems       Anesthetic History               Review of Systems / Medical History  Pertinent labs reviewed    Pulmonary        Sleep apnea: CPAP           Neuro/Psych         Psychiatric history    Comments: neuropathy Cardiovascular    Hypertension              Exercise tolerance: >4 METS  Comments: Clean cath 2016. GI/Hepatic/Renal  Within defined limits              Endo/Other    Diabetes (diet controlled)    Obesity     Other Findings            Physical Exam    Airway  Mallampati: II  TM Distance: 4 - 6 cm  Neck ROM: normal range of motion, short neck   Mouth opening: Normal     Cardiovascular    Rhythm: regular  Rate: abnormal        Comments: Mildly tachy Dental    Dentition: Caps/crowns     Pulmonary  Breath sounds clear to auscultation               Abdominal  GI exam deferred       Other Findings            Anesthetic Plan    ASA: 2  Anesthesia type: general          Induction: Intravenous  Anesthetic plan and risks discussed with: Patient and Spouse      LMA. Pt quite anxious, BP and HR elevated.

## 2020-01-28 NOTE — PROGRESS NOTES
01/28/20 1500   Dual Skin Pressure Injury Assessment   Dual Skin Pressure Injury Assessment WDL   Second Care Provider (Based on [address] [place] [address]) Purvi Dong RN   Skin Integumentary   Skin Integumentary (WDL) WDL   Wound Prevention and Protection Methods   Orientation of Wound Prevention Posterior   Location of Wound Prevention Sacrum/Coccyx   Dressing Present  No   Wound Offloading (Prevention Methods) Bed, pressure redistribution/air

## 2020-01-28 NOTE — BRIEF OP NOTE
BRIEF OPERATIVE NOTE    Date of Procedure: 1/28/2020     Preoperative Diagnosis: Benign prostatic hyperplasia (BPH) with urinary urgency [N40.1, R39.15]    Postoperative Diagnosis: Benign prostatic hyperplasia (BPH) with urinary urgency       Procedure(s):  CYSTOSCOPY, TRANS-URETHRAL RESECTION OF PROSTATE    Surgeon(s) and Role:     Carolyne Shah MD - Primary         Surgical Assistant: None    Surgical Staff:    Circ-1: Tressa Hopkins  Circ-2: Aida Arceo RN    Event Time In Time Out   Incision Start 0542    Incision Close 5021      Anesthesia: General     Estimated Blood Loss: 5 cc    Specimens:   ID Type Source Tests Collected by Time Destination   1 : prostate chips Preservative Prostate  Jose Harris MD 1/28/2020 4106 Pathology      Findings: Prominent median bar causing most of visual obstruction and mild coaptation of bilateral lateral lobes. Open channel at end of case. Veru intact.      Complications: None     Implants: * No implants in log *

## 2020-01-28 NOTE — PROGRESS NOTES
Care Management Interventions  Transition of Care Consult (CM Consult): Discharge Planning  Discharge Durable Medical Equipment: No  Physical Therapy Consult: No  Occupational Therapy Consult: No  Speech Therapy Consult: No  Discharge Location  Discharge Placement: Home    Chart screened by  for discharge planning. Patient likely to return home at discharge. No CM needs have been identified at this time. Please consult  if any new issues arise.

## 2020-01-28 NOTE — ANESTHESIA POSTPROCEDURE EVALUATION
Procedure(s):  CYSTOSCOPY TRANSURETHRAL RESECTION OF PROSTATE.    general    Anesthesia Post Evaluation        Patient location during evaluation: PACU  Patient participation: complete - patient participated  Level of consciousness: awake  Pain management: satisfactory to patient  Airway patency: patent  Anesthetic complications: no  Cardiovascular status: hemodynamically stable  Respiratory status: spontaneous ventilation  Hydration status: euvolemic  Post anesthesia nausea and vomiting:  none    Bedside , near baseline.   Vitals Value Taken Time   /92 1/28/2020  8:52 AM   Temp 36.6 °C (97.8 °F) 1/28/2020  8:52 AM   Pulse 141 1/28/2020  8:52 AM   Resp 22 1/28/2020  8:52 AM   SpO2 100 % 1/28/2020  8:52 AM

## 2020-01-28 NOTE — PROGRESS NOTES
Patient's CBI draining at slow rate with clear urine. No reports of pain. All hourly rounds performed. Call light within reach. No complaints at this time. Will continue with plan of care and give report to oncoming nurse.

## 2020-01-28 NOTE — H&P
[address] Urology H&P Note                                           01/28/20     Patient: Attila Hardwick  MRN: [id number]    Admission Date:  1/28/2020, 0  Admission Diagnosis: Benign prostatic hyperplasia (BPH) with urinary urgency [N40.1, R39.15]    ASSESSMENT: 63 y.o. male with BPH/LUTS and urinary retention who opted for cystoscopy/TURP today for treatement. PLAN:  -To OR for cystoscopy, trans-urethral resection of prostate (TURP) bipolar  -Consented  -Antibiotics on call to OR  -NPO for surgery  -Risk/benefit discussion was had and patient wants to proceed. __________________________________________________________________________________    HPI:     Attila Hardwick is a 63 y.o. male BPH/LUTS and urinary retention that is refractory to conservative management with PO medications. He was last seen by me on 12/20/19. He denies any changes since his last clinic visit but does report that it has become more difficult to urinate over the past 2-3 days. No recent UTI. No hematuria. Cystoscopy at prior clinic visit showed significant lateral lobe hypertrophy.      Past Medical History:  Past Medical History:   Diagnosis Date    Anxiety and depression     Attention deficit disorder     Chronic constipation     Chronic pain     fibromyalgia    Colon polyps 2013, 2018    repeat colonoscopy every 5 yrs    Diabetes ([facility] [address]     metformin previously-now diet controlled- 8/2019  A1C 5.9 (patient reports)- does not check BS regularly-  hypo less than 80 (when taking metformin)    Dyslipidemia     Essential hypertension     managed with meds    Fibromyalgia     Hematuria, microscopic     Hypertrophy of prostate with urinary obstruction and other lower urinary tract symptoms (LUTS)     Hypogonadism, testicular     Irritable bowel syndrome     Obesity     Obesity (BMI 30.0-34.9) 01/21/2020    BMI 34.26    Obstructive sleep apnea on CPAP     uses CPAP    PONV (postoperative nausea and vomiting)     Prediabetes     PUD (peptic ulcer disease) 1970s    Ventral hernia        Past Surgical History:  Past Surgical History:   Procedure Laterality Date    HX APPENDECTOMY  1976    HX CHOLECYSTECTOMY  11/2012    HX COLONOSCOPY      HX COLONOSCOPY      HX COLONOSCOPY      HX HEART CATHETERIZATION  02/19/2016    no blockages, no interventions    HX HERNIA REPAIR  07/2012    HX TONSIL AND ADENOIDECTOMY  1974    HX UROLOGICAL  1984    correction of right testicular torsion    HX UROLOGICAL      varicocelectomy    HX VASECTOMY  2005    HX VEIN STRIPPING  2005       Medications:  No current facility-administered medications on file prior to encounter. Current Outpatient Medications on File Prior to Encounter   Medication Sig Dispense Refill    finasteride (PROSCAR) 5 mg tablet Take 1 Tab by mouth daily. (Patient taking differently: Take 5 mg by mouth every morning.) 90 Tab 3    silodosin (RAPAFLO) 8 mg capsule TAKE ONE CAPSULE EACH MORNING 90 Cap 3    nortriptyline (PAMELOR) 25 mg capsule Take 1 Cap by mouth nightly. 90 Cap 0    losartan-hydroCHLOROthiazide (HYZAAR) 100-25 mg per tablet Take 1 Tab by mouth daily. 30 Tab 5    potassium chloride (KLOR-CON) 10 mEq tablet Take 1 Tab by mouth daily. 30 Tab 5    lubiPROStone (AMITIZA) 24 mcg capsule TAKE 1 CAPSULE TWICE A  Cap 1    glucose blood VI test strips (ASCENSIA AUTODISC VI, ONE TOUCH ULTRA TEST VI) strip Use 3 times daily. 100 Strip 3    Blood-Glucose Meter monitoring kit One Touch ultra test meter 1 Kit 0    Lancets misc One touch ultra; use 1 daily 1 Each 11    dicyclomine (BENTYL) 10 mg capsule Take 10 mg by mouth four (4) times daily as needed.          Allergies:  No Known Allergies    Social History:  Social History     Socioeconomic History    Marital status:      Spouse name: Not on file    Number of children: Not on file    Years of education: Not on file    Highest education level: Not on file   Occupational History    Not on file   Social Needs    Financial resource strain: Not on file    Food insecurity:     Worry: Not on file     Inability: Not on file    Transportation needs:     Medical: Not on file     Non-medical: Not on file   Tobacco Use    Smoking status: Never Smoker    Smokeless tobacco: Never Used   Substance and Sexual Activity    Alcohol use: No    Drug use: No    Sexual activity: Yes   Lifestyle    Physical activity:     Days per week: Not on file     Minutes per session: Not on file    Stress: Not on file   Relationships    Social connections:     Talks on phone: Not on file     Gets together: Not on file     Attends Jain service: Not on file     Active member of club or organization: Not on file     Attends meetings of clubs or organizations: Not on file     Relationship status: Not on file    Intimate partner violence:     Fear of current or ex partner: Not on file     Emotionally abused: Not on file     Physically abused: Not on file     Forced sexual activity: Not on file   Other Topics Concern    Not on file   Social History Narrative    Not on file       Family History:  Family History   Problem Relation Age of Onset    Cancer Mother         uterine/ovarian    Hypertension Father     Other Father 58        supranuclear palsy    Anxiety Father     Stroke Brother         TIA / had PFO    Heart Disease Brother         PFO       ROS:  Review of Systems - General ROS: negative for - chills, fatigue or fever  Respiratory ROS: no cough, shortness of breath, or wheezing  Cardiovascular ROS: no chest pain or dyspnea on exertion  Gastrointestinal ROS: no abdominal pain, change in bowel habits, or black or bloody stools  Musculoskeletal ROS: negative  negative for - muscular weakness    Vitals:  Visit Vitals  BP (!) 171/99 (BP 1 Location: Right arm, BP Patient Position: At rest)   Pulse (!) 116   Temp 97.9 °F (36.6 °C)   Resp 16   Ht 5' 9\" (1.753 m) Wt 225 lb 3.2 oz (102.2 kg)   SpO2 100%   BMI 33.26 kg/m²       Intake/Output:  No intake or output data in the 24 hours ending 01/28/20 0719     Physical Exam:   Visit Vitals  BP (!) 171/99 (BP 1 Location: Right arm, BP Patient Position: At rest)   Pulse (!) 116   Temp 97.9 °F (36.6 °C)   Resp 16   Ht 5' 9\" (1.753 m)   Wt 225 lb 3.2 oz (102.2 kg)   SpO2 100%   BMI 33.26 kg/m²        GENERAL: No acute distress, Awake, Alert, Oriented X 3  CARDIAC: regular rate and rhythm  CHEST AND LUNG: Easy work of breathing  ABDOMEN: soft, non tender, non-distended  SKIN: No rash, no erythema, no lacerations or abrasions, no ecchymosis  NEUROLOGIC: cranial nerves 2-12 grossly intact     Lab/Radiology/Other Diagnostic Tests:  No results for input(s): HGB, HCT, WBC, NA, K, CL, CO2, BUN, CR, GLU, CA, MG, ALBUMIN, ALT, PREALB, INR, HGBEXT, HCTEXT, INREXT in the last 72 hours. No lab exists for component: PLTCT, PO4, TOTPROT, TOTBILI, AST, ALKPHOS, LINDEN, LIPASE, PT, PTT      Bebo Hawthorne M.D.     [facility]  [facility]  [address] [place] [address] [address]  Phone: [phone]  Fax: [phone]

## 2020-01-28 NOTE — OP NOTES
[facility]  OPERATIVE REPORT    Name:  Latanya Pal  MR#:  [id number]  :  1960  ACCOUNT #:  [de-identified]  DATE OF SERVICE:  2020    PREOPERATIVE DIAGNOSIS:  Benign prostatic hyperplasia. POSTOPERATIVE DIAGNOSIS:  Benign prostatic hyperplasia. PROCEDURE PERFORMED:  Cystoscopy, transurethral resection of the prostate. SURGEON:  Desiree Guzman MD    ASSISTANT:  None. ANESTHESIA:  General.    COMPLICATIONS:  None. SPECIMENS REMOVED:  Prostate chips sent for Pathology. IMPLANTS:  Ceja catheter 24-Brazilian with 30 mL balloon. ESTIMATED BLOOD LOSS:  5 mL. FINDINGS:  Prominent median bar causing most of the visual obstruction with a short prostatic urethra and mild coaptation of the bilateral lateral lobes, otherwise normal pancystoscopy, open channel at the end of the case, verumontanum intact. INDICATIONS FOR OPERATIVE PROCEDURE:  The patient is a 72-year-old gentleman with a history of an enlarged prostate, refractory to management with medications. He still had significant bother and urinary symptoms and opted to proceed with surgical management in the operating room today in the form of TURP after risks, benefits discussion. DESCRIPTION OF OPERATIVE PROCEDURE:  After informed consent was obtained and the correct patient was identified in the preoperative holding area, he was taken back to the operating room suite and placed on table in the supine position. Time-out was performed confirming the correct patient and planned procedure. He received 2 g of IV Ancef prior to smooth induction of general endotracheal anesthesia. He was moved into the dorsal lithotomy position, prepped and draped in usual sterile fashion. I began the case by inserting a 26-Brazilian rigid bipolar resectoscope using the visual obturator under direct vision into the patient's bladder. Pancystoscopy was performed, which was unremarkable.   The patient did have a very prominent median bar causing a visual obstruction as well as mild coaptation of the lateral lobes. He also was noted to have a short prostatic urethra with the verumontanum in close proximity to the bladder neck. At this point, I switched out the visual obturator for my 24 loop resectoscope and began to systematically take down the median bar. I resected this down to the bladder neck muscle fibers. I took great care not to pass the verumontanum as the distal point of my resection in order to preserve the patient's continence. After I took down this median bar, the lateral lobes opened up rather nicely. There were two points of enlargement of the lateral lobes, so I took these down to create a nice wide open channel. I then cauterized all bleeders, evacuated the prostate chips from the patient's bladder. I then drained the bladder completely and carefully inspected for hemostasis, which was achieved. I also observed the ureteral orifices which were in the orthotopic positions bilaterally and uninvolved in the resection. I then carefully inspected the prostatic fossa, which was wide open without any irrigation and an empty bladder. I then carefully inspected the verumontanum and the external sphincter, which were completely intact and not violated. At this point, I filled the bladder with saline solution, removed my scope, and then placed a 24-Maori three-way Ceja catheter with 30 mL balloon into the patient's bladder. The catheter passed easily and the balloon was inflated with 30 mL of sterile water. Continuous bladder irrigation was initiated and was running clear. The catheter was also irrigated to confirm placement and irrigated well. The patient was then awoken from anesthesia, transferred to the PACU in stable condition. He tolerated the procedure well. There were no complications. All counts were correct at the end of the procedure.       MD CARMEN [id number]  D: 01/28/2020 9:03  T:  01/28/2020 12:52  JOB #:  [id number]

## 2020-01-28 NOTE — PERIOP NOTES
TRANSFER - OUT REPORT:    Verbal report given to [facility], RN on James Chamberlain  being transferred to [phone] for routine progression of care       Report consisted of patients Situation, Background, Assessment and   Recommendations(SBAR). Information from the following report(s) OR Summary, Procedure Summary, Intake/Output and MAR was reviewed with the receiving nurse. Lines:   Peripheral IV 01/28/20 Right;Posterior Hand (Active)   Site Assessment Clean, dry, & intact 1/28/2020  9:29 AM   Phlebitis Assessment 0 1/28/2020  9:29 AM   Infiltration Assessment 0 1/28/2020  9:29 AM   Dressing Status Clean, dry, & intact 1/28/2020  9:29 AM   Dressing Type Tape;Transparent 1/28/2020  9:29 AM   Hub Color/Line Status Pink;Patent 1/28/2020  9:29 AM   Alcohol Cap Used No 1/28/2020  9:29 AM        Opportunity for questions and clarification was provided. Patient transported with:   pt's personal cpap machine      VTE prophylaxis orders have been written for James Chamberlain. Patient and family given floor number and nurses name. Family updated re: pt status after security code verified.

## 2020-01-29 VITALS
RESPIRATION RATE: 17 BRPM | TEMPERATURE: 97.6 F | WEIGHT: 225.2 LBS | SYSTOLIC BLOOD PRESSURE: 147 MMHG | BODY MASS INDEX: 33.36 KG/M2 | OXYGEN SATURATION: 99 % | DIASTOLIC BLOOD PRESSURE: 91 MMHG | HEIGHT: 69 IN | HEART RATE: 98 BPM

## 2020-01-29 LAB
ANION GAP SERPL CALC-SCNC: 6 MMOL/L (ref 7–16)
BUN SERPL-MCNC: 17 MG/DL (ref 6–23)
CALCIUM SERPL-MCNC: 9 MG/DL (ref 8.3–10.4)
CHLORIDE SERPL-SCNC: 107 MMOL/L (ref 98–107)
CO2 SERPL-SCNC: 31 MMOL/L (ref 21–32)
CREAT SERPL-MCNC: 1.07 MG/DL (ref 0.8–1.5)
ERYTHROCYTE [DISTWIDTH] IN BLOOD BY AUTOMATED COUNT: 13 % (ref 11.9–14.6)
GLUCOSE BLD STRIP.AUTO-MCNC: 103 MG/DL (ref 65–100)
GLUCOSE BLD STRIP.AUTO-MCNC: 83 MG/DL (ref 65–100)
GLUCOSE SERPL-MCNC: 105 MG/DL (ref 65–100)
HCT VFR BLD AUTO: 45.5 % (ref 41.1–50.3)
HGB BLD-MCNC: 15.5 G/DL (ref 13.6–17.2)
MCH RBC QN AUTO: 29.6 PG (ref 26.1–32.9)
MCHC RBC AUTO-ENTMCNC: 34.1 G/DL (ref 31.4–35)
MCV RBC AUTO: 87 FL (ref 79.6–97.8)
NRBC # BLD: 0 K/UL (ref 0–0.2)
PLATELET # BLD AUTO: 184 K/UL (ref 150–450)
PMV BLD AUTO: 9.4 FL (ref 9.4–12.3)
POTASSIUM SERPL-SCNC: 3.9 MMOL/L (ref 3.5–5.1)
RBC # BLD AUTO: 5.23 M/UL (ref 4.23–5.6)
SODIUM SERPL-SCNC: 144 MMOL/L (ref 136–145)
WBC # BLD AUTO: 11.2 K/UL (ref 4.3–11.1)

## 2020-01-29 PROCEDURE — 80048 BASIC METABOLIC PNL TOTAL CA: CPT

## 2020-01-29 PROCEDURE — 74011250636 HC RX REV CODE- 250/636: Performed by: UROLOGY

## 2020-01-29 PROCEDURE — 99218 HC RM OBSERVATION: CPT

## 2020-01-29 PROCEDURE — 74011250637 HC RX REV CODE- 250/637: Performed by: UROLOGY

## 2020-01-29 PROCEDURE — 82962 GLUCOSE BLOOD TEST: CPT

## 2020-01-29 PROCEDURE — 36415 COLL VENOUS BLD VENIPUNCTURE: CPT

## 2020-01-29 PROCEDURE — 85027 COMPLETE CBC AUTOMATED: CPT

## 2020-01-29 RX ORDER — HYOSCYAMINE SULFATE 0.12 MG/1
0.12 TABLET SUBLINGUAL
Qty: 30 TAB | Refills: 1 | Status: SHIPPED | OUTPATIENT
Start: 2020-01-29 | End: 2020-05-20

## 2020-01-29 RX ORDER — SODIUM CHLORIDE 0.9 % (FLUSH) 0.9 %
5-40 SYRINGE (ML) INJECTION EVERY 8 HOURS
Status: DISCONTINUED | OUTPATIENT
Start: 2020-01-29 | End: 2020-01-29 | Stop reason: HOSPADM

## 2020-01-29 RX ORDER — SODIUM CHLORIDE 0.9 % (FLUSH) 0.9 %
5-40 SYRINGE (ML) INJECTION AS NEEDED
Status: DISCONTINUED | OUTPATIENT
Start: 2020-01-29 | End: 2020-01-29 | Stop reason: HOSPADM

## 2020-01-29 RX ADMIN — LUBIPROSTONE 24 MCG: 24 CAPSULE, GELATIN COATED ORAL at 08:14

## 2020-01-29 RX ADMIN — ACETAMINOPHEN 650 MG: 325 TABLET, FILM COATED ORAL at 10:01

## 2020-01-29 RX ADMIN — LOSARTAN POTASSIUM: 50 TABLET, FILM COATED ORAL at 08:14

## 2020-01-29 RX ADMIN — FINASTERIDE 5 MG: 5 TABLET, FILM COATED ORAL at 08:15

## 2020-01-29 RX ADMIN — POTASSIUM CHLORIDE 10 MEQ: 10 TABLET, EXTENDED RELEASE ORAL at 08:14

## 2020-01-29 RX ADMIN — Medication 2 G: at 08:15

## 2020-01-29 RX ADMIN — TAMSULOSIN HYDROCHLORIDE 0.4 MG: 0.4 CAPSULE ORAL at 08:14

## 2020-01-29 NOTE — PROGRESS NOTES
Urology Progress Note    Admit Date: 1/28/2020    Subjective:     Patient has no new complaints. Catheter draining clear yellow on very slow drip CBI. No nausea/emesis. Tolerating diet. Pain controlled. Has not ambulated. Objective:     Patient Vitals for the past 8 hrs:   BP Temp Pulse Resp SpO2   01/29/20 0430 163/86 97.8 °F (36.6 °C) (!) 114 16 99 %   01/29/20 0030 176/85 98.2 °F (36.8 °C) 100 16 99 %     01/28 1901 - 01/29 0700  In: -   Out: 1250 [FRLBA:9778]  01/27 0701 - 01/28 1900  In: 67777 [P.O.:240;  I.V.:1420]  Out: 29761 [Urine:73559]    Physical Exam:   Visit Vitals  /86   Pulse (!) 114   Temp 97.8 °F (36.6 °C)   Resp 16   Ht 5' 9\" (1.753 m)   Wt 225 lb 3.2 oz (102.2 kg)   SpO2 99%   BMI 33.26 kg/m²        GENERAL: No acute distress, Awake, Alert, Oriented X 3, Gait normal  CARDIAC: regular rate and rhythm  CHEST AND LUNG: Easy work of breathing, clear to auscultation bilaterally, no cyanosis  ABDOMEN: soft, non tender, non-distended, positive bowel sounds, no organomegaly, no palpable masses, no guarding, no rebound tenderness  : Ceja draining clear yellow on very slow drip CBI  SKIN: No rash, no erythema, no lacerations or abrasions, no ecchymosis  NEUROLOGIC: cranial nerves 2-12 grossly intact           Data Review   Recent Results (from the past 24 hour(s))   TYPE & SCREEN    Collection Time: 01/28/20  6:31 AM   Result Value Ref Range    Crossmatch Expiration 01/31/2020     ABO/Rh(D) B POSITIVE     Antibody screen NEG    GLUCOSE, POC    Collection Time: 01/28/20  6:31 AM   Result Value Ref Range    Glucose (POC) 113 (H) 65 - 100 mg/dL   HEMOGLOBIN A1C WITH EAG    Collection Time: 01/28/20  2:56 PM   Result Value Ref Range    Hemoglobin A1c 5.9 4.8 - 6.0 %    Est. average glucose 123 mg/dL   GLUCOSE, POC    Collection Time: 01/28/20  3:53 PM   Result Value Ref Range    Glucose (POC) 125 (H) 65 - 100 mg/dL   GLUCOSE, POC    Collection Time: 01/28/20  8:28 PM   Result Value Ref Range Glucose (POC) 160 (H) 65 - 100 mg/dL           Assessment:     Active Problems:    BPH (benign prostatic hyperplasia) (1/28/2020)      POD 1 s/p TURP. Doing well    Plan:     -Ceja out  -Regular diet  -SLIV  -OOB/Ambulate  -PO Pain control  -D/C today once voids. F/U 2 weeks with NP. Office to call with appointment. Bebo Cooper M.D.     [facility]  [facility]  Phone: [phone]  Fax: [phone]

## 2020-01-29 NOTE — PROGRESS NOTES
Care Management Interventions  Transition of Care Consult (CM Consult): Discharge Planning  Discharge Durable Medical Equipment: No  Physical Therapy Consult: No  Occupational Therapy Consult: No  Speech Therapy Consult: No  Discharge Location  Discharge Placement: Home    Patient to discharge home. No CM needs have been identified for discharge. All milestones have been met. Patient will transport home with family.

## 2020-01-29 NOTE — PROGRESS NOTES
Ceja catheter discontinued per order. Pt instructed to use urinal for staff to measure and observe urine output.

## 2020-01-29 NOTE — DISCHARGE INSTRUCTIONS
Patient Education        Benign Prostatic Hyperplasia: Care Instructions  Your Care Instructions    Benign prostatic hyperplasia, or BPH, is an enlarged prostate gland. The prostate is a small gland that makes some of the fluid in semen. Prostate enlargement happens to almost all men as they age. It is usually not serious. BPH does not cause prostate cancer. As the prostate gets bigger, it may partly block the flow of urine. You may have a hard time getting a urine stream started or completely stopped. BPH can cause dribbling. You may have a weak urine stream, or you may have to urinate more often than you used to, especially at night. Most men find these problems easy to manage. You do not need treatment unless your symptoms bother you a lot or you have other problems, such as bladder infections or stones. In these cases, medicines may help. Surgery is not needed unless the urine flow is blocked or the symptoms do not get better with medicine. Follow-up care is a key part of your treatment and safety. Be sure to make and go to all appointments, and call your doctor if you are having problems. It's also a good idea to know your test results and keep a list of the medicines you take. How can you care for yourself at home? · Take plenty of time to urinate. Try to relax. · Try \"double voiding. \" Urinate as much you can, relax for a few moments, and then try to urinate again. · Sit on the toilet to urinate. · Read or think of other things while you are waiting. · Turn on a faucet, or try to picture running water. Some men find that this helps get their urine flowing. · If dribbling is a problem, wash your penis daily to avoid skin irritation and infection. · Avoid caffeine and alcohol. These drinks will increase how often you need to urinate. Spread your fluid intake throughout the day. If the urge to urinate often wakes you at night, limit your fluid intake in the evening.  Urinate right before you go to bed.  · Many over-the-counter cold and allergy medicines can make the symptoms of BPH worse. Avoid antihistamines, decongestants, and allergy pills, if you can. Read the warnings on the package. · If you take any prescription medicines, especially tranquilizers or antidepressants, ask your doctor or pharmacist whether they can cause urination problems. There may be other medicines you can use that do not cause urinary problems. · Be safe with medicines. Take your medicines exactly as prescribed. Call your doctor if you think you are having a problem with your medicine. When should you call for help? Call your doctor now or seek immediate medical care if:    · You cannot urinate at all.     · You have symptoms of a urinary infection. For example:  ? You have blood or pus in your urine. ? You have pain in your back just below your rib cage. This is called flank pain. ? You have a fever, chills, or body aches. ? It hurts to urinate. ? You have groin or belly pain.    Watch closely for changes in your health, and be sure to contact your doctor if:    · It hurts when you ejaculate.     · Your urinary problems get a lot worse or bother you a lot. Where can you learn more? Go to http://miguel angel-kumar.info/. Enter W534 in the search box to learn more about \"Benign Prostatic Hyperplasia: Care Instructions. \"  Current as of: May 28, 2019  Content Version: 12.2  © 7164-3330 [employer]. Care instructions adapted under license by [employer]. (which disclaims liability or warranty for this information). If you have questions about a medical condition or this instruction, always ask your healthcare professional. [place] [address] any warranty or liability for your use of this information. Patient Education        Learning About Transurethral Resection of the Prostate (TURP)  What is transurethral resection of the prostate (TURP)?     Transurethral resection of the prostate (TURP) is surgery to remove some prostate tissue. It is done when an overgrown prostate gland is pressing on the urethra and making it hard for a man to urinate. The prostate gland is a small organ just below a man's bladder. It makes most of the fluid in semen. The urethra is the tube that carries urine from the bladder out of the body through the penis. It passes through the prostate. When the prostate gets too large, it can press on the urethra. TURP is done to take pressure off of the urethra. It can help you have better control over starting and stopping your urine stream. You may feel like you get more relief when you urinate. How is the surgery done? Your doctor will give you medicine to make you sleep or feel relaxed. You will be kept comfortable. If you are awake during the surgery, you will get medicine to numb you from the chest down. The doctor will put a thin, lighted tube, which is called a scope, into your urethra through the opening in your penis. Then the doctor will put small surgical tools or a tiny laser through the scope. He or she will then cut or burn away the section of the prostate that is blocking urine flow. When the surgery is finished, the doctor will take out the scope. What can you expect after the surgery? You may stay in the hospital for 1 to 2 days after the surgery. You may be able to go back to work and do many of your usual activities in 1 to 3 weeks. But it is important to avoid heavy lifting or strenuous activities for about 6 weeks. If your surgery was done with a laser, you may feel better faster. Most men go home on the day of laser surgery, then see their doctor soon after. You may be able to go back to work and your usual activities after a few days. And you may be able to return to strenuous activity and heavy lifting after about 2 weeks. But talk to your doctor first.  Laurita Mcguire may need a urinary catheter for a short time.  This is a flexible plastic tube used to drain urine from your bladder when you can't urinate on your own. If it is still in place when you go home, your doctor will give you instructions for how to care for your catheter. You may still feel like you need to urinate often in the weeks after your surgery. It often takes up to 6 weeks for this to get better. After they recover from surgery, most men still can have erections (if they were able to have them before surgery). But they may not ejaculate when they have an orgasm. Semen may go into the bladder instead of out through the penis. This is called retrograde ejaculation. It does not hurt and is not harmful to your health. But it may mean that you will not be able to father a child. If this is a concern, talk to your doctor. You may be able to save your sperm before the surgery. Follow-up care is a key part of your treatment and safety. Be sure to make and go to all appointments, and call your doctor if you are having problems. It's also a good idea to know your test results and keep a list of the medicines you take. Where can you learn more? Go to http://miguel angel-kumar.info/. Enter I277 in the search box to learn more about \"Learning About Transurethral Resection of the Prostate (TURP). \"  Current as of: May 28, 2019  Content Version: 12.2  © 1648-1309 [employer]. Care instructions adapted under license by [employer] (which disclaims liability or warranty for this information). If you have questions about a medical condition or this instruction, always ask your healthcare professional. [place] [address] any warranty or liability for your use of this information. Patient Education        Transurethral Resection of the Prostate (TURP): What to Expect at [facility]    Transurethral resection of the prostate (TURP) is surgery to remove prostate tissue.  It is done when an overgrown prostate gland is pressing on the urethra and making it hard for a man to urinate. You may need a urinary catheter for a short time. It is a flexible plastic tube used to drain urine from your bladder when you can't urinate on your own. If it is still in place when you go home, your doctor will give you instructions on how to care for your catheter. For several days after surgery, you may feel burning when you urinate. Your urine may be pink for 1 to 3 weeks after surgery. You also may have bladder cramps, or spasms. Your doctor may give you medicine to help control the spasms. You may still feel like you need to urinate often in the weeks after your surgery. It often takes up to 6 weeks for this to get better. After you have healed, you may have less trouble urinating. You may have better control over starting and stopping your urine stream. And you may feel like you get more relief when you urinate. Most men can return to work or many of their usual tasks in 1 to 3 weeks. But for about 6 weeks, try to avoid heavy lifting and strenuous activities that might put extra pressure on your bladder. Most men still can have erections after surgery (if they were able to have them before surgery). But they may not ejaculate when they have an orgasm. Semen may go into the bladder instead of out through the penis. This is called retrograde ejaculation. It does not hurt and is not harmful to your health. This care sheet gives you a general idea about how long it will take for you to recover. But each person recovers at a different pace. Follow the steps below to get better as quickly as possible. How can you care for yourself at home? Activity    · Rest when you feel tired.     · Be active. Walking is a good choice.     · Allow your body to heal. Don't move quickly or lift anything heavy until you are feeling better.     · Ask your doctor when you can drive again.     · Many people are able to return to work within 1 to 3 weeks after surgery.  It depends on the type of work you do and how you feel.     · Do not put anything in your rectum, such as an enema or suppository, for 4 to 6 weeks after the surgery.     · You may shower and take baths when your doctor says it is okay.     · Ask your doctor when it is okay for you to have sex. Diet    · You can eat your normal diet. If your stomach is upset, try bland, low-fat foods like plain rice, broiled chicken, toast, and yogurt.     · If your bowel movements are not regular right after surgery, try to avoid constipation and straining. Drink plenty of water. Your doctor may suggest fiber, a stool softener, or a mild laxative. Medicines    · Your doctor will tell you if and when you can restart your medicines. He or she will also give you instructions about taking any new medicines.     · If you take aspirin or some other blood thinner, be sure to talk to your doctor. He or she will tell you if and when to start taking those medicines again. Make sure that you understand exactly what your doctor wants you to do.     · Be safe with medicines. Read and follow all instructions on the label. ? If the doctor gave you a prescription medicine for pain, take it as prescribed. ? If you are not taking a prescription pain medicine, ask your doctor if you can take an over-the-counter medicine.     · Take your antibiotics as directed. Do not stop taking them just because you feel better. You need to take the full course of antibiotics. Follow-up care is a key part of your treatment and safety. Be sure to make and go to all appointments, and call your doctor if you are having problems. It's also a good idea to know your test results and keep a list of the medicines you take. When should you call for help? Call 911anytime you think you may need emergency care.  For example, call if:    · You passed out (lost consciousness).     · You have chest pain, are short of breath, or cough up blood.    Call your doctor now or seek immediate medical care if:    · You have pain that does not get better after you take pain medicine.     · You have new or more blood clots in your urine. (It is normal for the urine to be pink for a few days.)     · You can't pass urine.     · You have symptoms of a urinary tract infection. These may include:  ? Pain or burning when you urinate. ? A frequent need to urinate without being able to pass much urine. ? Pain in the flank, which is just below the rib cage and above the waist on either side of the back. ? Blood in your urine. ? A fever.     · You are sick to your stomach or can't keep down fluids.     · You have signs of a blood clot in your leg (called a deep vein thrombosis), such as:  ? Pain in your calf, back of the knee, thigh, or groin. ? Redness or swelling in your leg.    Watch closely for changes in your health, and be sure to contact your doctor if you have problems. Where can you learn more? Go to http://miguel angel-kumar.info/. Enter L219 in the search box to learn more about \"Transurethral Resection of the Prostate (TURP): What to Expect at Home. \"  Current as of: May 28, 2019  Content Version: 12.2  © 4012-0756 [employer]. Care instructions adapted under license by [employer] (which disclaims liability or warranty for this information). If you have questions about a medical condition or this instruction, always ask your healthcare professional. [place] [address] any warranty or liability for your use of this information.

## 2020-01-29 NOTE — PROGRESS NOTES
Per Primary RN, Saadia Vázquez. Pt has urinated appropriate amount for discharge. Rx discussed. Questions answered. Discharge instructions given to Pt and spouse at bedside. PIV removed.

## 2022-03-18 PROBLEM — F41.1 GENERALIZED ANXIETY DISORDER: Status: ACTIVE | Noted: 2017-09-06

## 2022-03-18 PROBLEM — E66.01 SEVERE OBESITY WITH BODY MASS INDEX (BMI) OF 35.0 TO 39.9 WITH SERIOUS COMORBIDITY (HCC): Status: ACTIVE | Noted: 2018-06-21

## 2022-03-18 PROBLEM — R20.0 NUMBNESS AND TINGLING IN LEFT HAND: Status: ACTIVE | Noted: 2019-12-04

## 2022-03-18 PROBLEM — R20.2 NUMBNESS AND TINGLING IN LEFT HAND: Status: ACTIVE | Noted: 2019-12-04

## 2022-03-19 PROBLEM — F33.40 RECURRENT MAJOR DEPRESSIVE DISORDER, IN REMISSION (HCC): Status: ACTIVE | Noted: 2017-09-06

## 2022-03-19 PROBLEM — K40.90 LEFT INGUINAL HERNIA: Status: ACTIVE | Noted: 2018-07-12

## 2022-03-19 PROBLEM — N40.0 BPH (BENIGN PROSTATIC HYPERPLASIA): Status: ACTIVE | Noted: 2020-01-28

## 2022-03-19 PROBLEM — G56.22 ULNAR NEUROPATHY AT ELBOW, LEFT: Status: ACTIVE | Noted: 2019-12-04

## 2022-03-19 PROBLEM — G56.02 CARPAL TUNNEL SYNDROME OF LEFT WRIST: Status: ACTIVE | Noted: 2019-12-04

## 2022-03-19 PROBLEM — E16.2 HYPOGLYCEMIA: Status: ACTIVE | Noted: 2017-11-28

## 2022-03-19 PROBLEM — M79.602 PAIN OF LEFT UPPER EXTREMITY: Status: ACTIVE | Noted: 2019-12-04

## 2022-03-19 PROBLEM — T81.89XA SUTURE REACTION: Status: ACTIVE | Noted: 2020-01-07

## 2022-03-20 PROBLEM — F33.1 MODERATE EPISODE OF RECURRENT MAJOR DEPRESSIVE DISORDER (HCC): Status: ACTIVE | Noted: 2017-06-06

## 2022-07-01 ENCOUNTER — TELEPHONE (OUTPATIENT)
Dept: ORTHOPEDIC SURGERY | Age: 62
End: 2022-07-01

## 2022-09-28 ENCOUNTER — OFFICE VISIT (OUTPATIENT)
Dept: UROLOGY | Age: 62
End: 2022-09-28

## 2022-09-28 DIAGNOSIS — N40.0 BENIGN PROSTATIC HYPERPLASIA WITHOUT LOWER URINARY TRACT SYMPTOMS: Primary | ICD-10-CM

## 2022-09-28 DIAGNOSIS — N45.1 EPIDIDYMITIS: ICD-10-CM

## 2022-09-28 DIAGNOSIS — N41.1 CHRONIC PROSTATITIS: ICD-10-CM

## 2022-09-28 LAB
BILIRUBIN, URINE, POC: NEGATIVE
BLOOD URINE, POC: ABNORMAL
GLUCOSE URINE, POC: NEGATIVE
KETONES, URINE, POC: NEGATIVE
LEUKOCYTE ESTERASE, URINE, POC: NEGATIVE
NITRITE, URINE, POC: NEGATIVE
PH, URINE, POC: 7 (ref 4.6–8)
PROTEIN,URINE, POC: NEGATIVE
SPECIFIC GRAVITY, URINE, POC: 1.01 (ref 1–1.03)
URINALYSIS CLARITY, POC: ABNORMAL
URINALYSIS COLOR, POC: ABNORMAL
UROBILINOGEN, POC: ABNORMAL

## 2022-09-28 PROCEDURE — 99214 OFFICE O/P EST MOD 30 MIN: CPT | Performed by: UROLOGY

## 2022-09-28 PROCEDURE — 81003 URINALYSIS AUTO W/O SCOPE: CPT | Performed by: UROLOGY

## 2022-09-28 RX ORDER — CIPROFLOXACIN 500 MG/5ML
500 KIT ORAL 2 TIMES DAILY
Qty: 300 ML | Refills: 0 | Status: SHIPPED | OUTPATIENT
Start: 2022-09-28 | End: 2022-10-28

## 2022-09-28 ASSESSMENT — ENCOUNTER SYMPTOMS
INDIGESTION: 0
DIARRHEA: 0
WHEEZING: 0
VOMITING: 0
SHORTNESS OF BREATH: 0
ABDOMINAL PAIN: 0
EYE DISCHARGE: 0
CONSTIPATION: 0
BACK PAIN: 0
HEARTBURN: 0
SKIN LESIONS: 0
NAUSEA: 0
BLOOD IN STOOL: 0
EYE PAIN: 0
COUGH: 0

## 2022-09-28 NOTE — PROGRESS NOTES
[facility]  [address] [place] [address] [address], [address] [facility]  [phone]    Tin Hernandez  : 1960    Chief Complaint   Patient presents with    Follow-up     HPI     Tin Hernandez is a 65 y.o.  male with a history of BPH/LUTS refractory to medications who is s/p TURP on 20. Returns for follow up today. Seen 2022 for prostatitis. He was treated with 4 weeks cipro at that time with resolution of symptoms. Today, he reports doing well until about 2 weeks ago. He has since had prostate pain and pain underneath the scrotum that comes/goes, concerning for prostatitis. Also has bilateral groin and SP discomfort just above base of penis. He is on elavil. No urgency, frequency, dysuria, hematuria. No retention. No incontinence. Not on any prostate/bladder meds. U/A negative for UTI today. Denies known cause. Stream is not weak. Empties well. Unable to tolerate pills. Prefers liquid antibiotic. PVR: 34 cc down from 126 cc down from 256 cc immediately post-TURP.       Lab Results   Component Value Date    PSA 1.3 2022    PSA 1.4 2020         Past Medical History:   Diagnosis Date    Anxiety and depression     Attention deficit disorder     Chronic constipation     Chronic pain     fibromyalgia    Colon polyps ,     repeat colonoscopy every 5 yrs    Diabetes ([facility] [address]     metformin previously-now diet controlled- 2019  A1C 5.9 (patient reports)- does not check BS regularly-  hypo less than 80 (when taking metformin)    Dyslipidemia     Essential hypertension     managed with meds    Fibromyalgia     Hematuria, microscopic     Hypertrophy of prostate with urinary obstruction and other lower urinary tract symptoms (LUTS)     Hypogonadism, testicular     Irritable bowel syndrome     Obesity     Obesity (BMI 30.0-34.9) 2020    BMI 34.26    Obstructive sleep apnea on CPAP     uses CPAP    PONV (postoperative nausea and vomiting) Prediabetes     PUD (peptic ulcer disease) 1970s    Ventral hernia      Past Surgical History:   Procedure Laterality Date    APPENDECTOMY  1976    CARDIAC CATHETERIZATION  02/19/2016    no blockages, no interventions    CHOLECYSTECTOMY  11/2012    COLONOSCOPY      COLONOSCOPY      COLONOSCOPY      HERNIA REPAIR  07/2012    TONSILLECTOMY AND ADENOIDECTOMY  1974    UROLOGICAL SURGERY  1984    correction of right testicular torsion    UROLOGICAL SURGERY      varicocelectomy    VASECTOMY  2005    VEIN SURGERY  2005     Current Outpatient Medications   Medication Sig Dispense Refill    ciprofloxacin (CIPRO) 500 MG/5ML (10%) suspension Take 5 mLs by mouth 2 times daily 300 mL 0    amitriptyline (ELAVIL) 25 MG tablet Take 25 mg by mouth      losartan-hydroCHLOROthiazide (HYZAAR) 100-25 MG per tablet Take 1 tablet by mouth daily      meclizine (ANTIVERT) 25 MG tablet Take by mouth 3 times daily as needed      potassium chloride (KLOR-CON M) 10 MEQ extended release tablet Take 10 mEq by mouth daily       No current facility-administered medications for this visit.      Allergies   Allergen Reactions    Pregabalin Other (See Comments)    Prednisone Other (See Comments)     Social History     Socioeconomic History    Marital status:      Spouse name: Not on file    Number of children: Not on file    Years of education: Not on file    Highest education level: Not on file   Occupational History    Not on file   Tobacco Use    Smoking status: Never    Smokeless tobacco: Never   Substance and Sexual Activity    Alcohol use: No    Drug use: No    Sexual activity: Not on file   Other Topics Concern    Not on file   Social History Narrative    Not on file     Social Determinants of Health     Financial Resource Strain: Not on file   Food Insecurity: Not on file   Transportation Needs: Not on file   Physical Activity: Not on file   Stress: Not on file   Social Connections: Not on file   Intimate Partner Violence: Not on file Housing Stability: Not on file     Family History   Problem Relation Age of Onset    Hypertension Father     Cancer Mother         uterine/ovarian    Heart Disease Brother         PFO    Stroke Brother         TIA / had PFO    Anxiety Disorder Father     Other Father 58        supranuclear palsy       Review of Systems  Constitutional:   Negative for fever, chills, appetite change, malaise/fatigue, headaches and weight loss. Skin:  Negative for skin lesions, rash and itching. Eyes:  Negative for visual disturbance, eye pain and eye discharge. ENT:  Negative for difficulty articulating words, pain swallowing, high frequency hearing loss and dry mouth. Respiratory:  Negative for cough, blood in sputum, shortness of breath and wheezing. Cardiovascular:  Negative for chest pain, hypertension, irregular heartbeat, leg pain, leg swelling, regular rate and rhythm and varicose veins. GI:  Negative for nausea, vomiting, abdominal pain, blood in stool, constipation, diarrhea, indigestion and heartburn. Genitourinary: Positive for slower stream and testicular pain. Negative for urinary burning, hematuria, flank pain, recurrent UTIs, history of urolithiasis, nocturia, straining, urgency, leakage w/ urge, frequent urination, incomplete emptying, erectile dysfunction, sexually transmitted disease, discharge and urethral stricture. Musculoskeletal:  Negative for back pain, bone pain, arthralgias, tenderness, muscle weakness and neck pain. Neurological:  Negative for dizziness, focal weakness, numbness, seizures and tremors. Psychological:  Negative for depression and psychiatric problem. Endocrine:  Negative for cold intolerance, thirst, excessive urination, fatigue and heat intolerance. Hem/Lymphatic:  Negative for easy bleeding, easy bruising and frequent infections.     Urinalysis  UA - Dipstick  Results for orders placed or performed in visit on 09/28/22   AMB POC URINALYSIS DIP STICK AUTO W/O MICRO   Result Value Ref Range    Color (UA POC)      Clarity (UA POC)      Glucose, Urine, POC Negative Negative    Bilirubin, Urine, POC Negative Negative    KETONES, Urine, POC Negative Negative    Specific Gravity, Urine, POC 1.010 1.001 - 1.035    Blood (UA POC) Trace Negative    pH, Urine, POC 7.0 (A) 4.6 - 8.0    Protein, Urine, POC Negative Negative    Urobilinogen, POC 0.2 mg/dL     Nitrite, Urine, POC Negative Negative    Leukocyte Esterase, Urine, POC Negative Negative       There were no vitals taken for this visit. GENERAL: No acute distress, Awake, Alert, Oriented X 3, Gait normal  HEENT: Trachea midline, No gross visual or auditory impairments  CARDIAC: regular rate and rhythm  CHEST AND LUNG: Easy work of breathing, clear to auscultation bilaterally, no cyanosis  ABDOMEN: soft, non tender, non-distended, positive bowel sounds, no organomegaly, no palpable masses, no guarding, no rebound tenderness  : Circumcised phallus without abnormality, Testicles descended in scrotum bilaterally and without palpable mass/nodule, tender bilaterally, no edema, no skin erythema, vas deferens palpable bilaterally, no hydrocele, no inguinal hernias, no inguinal lymphadenopathy  SKIN: No rash, no erythema, no lacerations or abrasions, no ecchymosis  NEUROLOGIC: cranial nerves 2-12 grossly intact       Assessment and Plan    ICD-10-CM    1. Benign prostatic hyperplasia without lower urinary tract symptoms  N40.0 AMB POC URINALYSIS DIP STICK AUTO W/O MICRO      2. Chronic prostatitis  N41.1 AMB POC URINALYSIS DIP STICK AUTO W/O MICRO      3. Epididymitis  N45.1 ciprofloxacin (CIPRO) 500 MG/5ML (10%) suspension        Patient's symptoms and exam today are most concerning for epididymitis. Discussed scrotal support, empiric antibiotics and NSAIDs PRN pain. He wants liquid antibiotic. Cannot tolerate pills. 4 weeks Cipro PO liquid given. If no improvement after 1 month or if symptoms worsen, then he will let me know.      I have spent 30 minutes today reviewing previous notes, test results and face to face with the patient as well as documenting. Ayaan Pinzon M.D.     [facility]  [address]  Phone: [phone]  Fax: [phone]

## 2022-11-07 ENCOUNTER — OFFICE VISIT (OUTPATIENT)
Dept: CARDIOLOGY CLINIC | Age: 62
End: 2022-11-07
Payer: MEDICARE

## 2022-11-07 VITALS
BODY MASS INDEX: 35.79 KG/M2 | HEIGHT: 70 IN | SYSTOLIC BLOOD PRESSURE: 132 MMHG | DIASTOLIC BLOOD PRESSURE: 84 MMHG | HEART RATE: 91 BPM | WEIGHT: 250 LBS

## 2022-11-07 DIAGNOSIS — I10 ESSENTIAL HYPERTENSION: ICD-10-CM

## 2022-11-07 DIAGNOSIS — R07.9 CHEST PAIN, UNSPECIFIED TYPE: Primary | ICD-10-CM

## 2022-11-07 DIAGNOSIS — R00.2 PALPITATION: ICD-10-CM

## 2022-11-07 PROCEDURE — G8427 DOCREV CUR MEDS BY ELIG CLIN: HCPCS | Performed by: INTERNAL MEDICINE

## 2022-11-07 PROCEDURE — G8484 FLU IMMUNIZE NO ADMIN: HCPCS | Performed by: INTERNAL MEDICINE

## 2022-11-07 PROCEDURE — 3075F SYST BP GE 130 - 139MM HG: CPT | Performed by: INTERNAL MEDICINE

## 2022-11-07 PROCEDURE — 1036F TOBACCO NON-USER: CPT | Performed by: INTERNAL MEDICINE

## 2022-11-07 PROCEDURE — G8417 CALC BMI ABV UP PARAM F/U: HCPCS | Performed by: INTERNAL MEDICINE

## 2022-11-07 PROCEDURE — 3078F DIAST BP <80 MM HG: CPT | Performed by: INTERNAL MEDICINE

## 2022-11-07 PROCEDURE — 93000 ELECTROCARDIOGRAM COMPLETE: CPT | Performed by: INTERNAL MEDICINE

## 2022-11-07 PROCEDURE — 3017F COLORECTAL CA SCREEN DOC REV: CPT | Performed by: INTERNAL MEDICINE

## 2022-11-07 RX ORDER — LUBIPROSTONE 24 UG/1
CAPSULE, GELATIN COATED ORAL
COMMUNITY
Start: 2017-01-02

## 2022-11-07 ASSESSMENT — ENCOUNTER SYMPTOMS
HEMATEMESIS: 0
SPUTUM PRODUCTION: 0
DIARRHEA: 0
WHEEZING: 0
BOWEL INCONTINENCE: 0
ABDOMINAL PAIN: 0
BLURRED VISION: 0
NAUSEA: 0
VOMITING: 0
SHORTNESS OF BREATH: 0
HEMOPTYSIS: 0
COUGH: 0
HOARSE VOICE: 0
BACK PAIN: 0
ORTHOPNEA: 0
COLOR CHANGE: 0
HEMATOCHEZIA: 0

## 2022-11-07 NOTE — PROGRESS NOTES
losartan-hydroCHLOROthiazide (HYZAAR) 100-25 MG per tablet, Take 1 tablet by mouth daily, Disp: , Rfl:     meclizine (ANTIVERT) 25 MG tablet, Take by mouth 3 times daily as needed, Disp: , Rfl:   Allergies   Allergen Reactions    Pregabalin Other (See Comments)    Prednisone Other (See Comments)     Past Medical History:   Diagnosis Date    Anxiety and depression     Attention deficit disorder     Chronic constipation     Chronic pain     fibromyalgia    Colon polyps 2013, 2018    repeat colonoscopy every 5 yrs    Diabetes ([facility] [address]     metformin previously-now diet controlled- 8/2019  A1C 5.9 (patient reports)- does not check BS regularly-  hypo less than 80 (when taking metformin)    Dyslipidemia     Essential hypertension     managed with meds    Fibromyalgia     Hematuria, microscopic     Hypertrophy of prostate with urinary obstruction and other lower urinary tract symptoms (LUTS)     Hypogonadism, testicular     Irritable bowel syndrome     Obesity     Obesity (BMI 30.0-34.9) 01/21/2020    BMI 34.26    Obstructive sleep apnea on CPAP     uses CPAP    PONV (postoperative nausea and vomiting)     Prediabetes     PUD (peptic ulcer disease) 1970s    Ventral hernia      Past Surgical History:   Procedure Laterality Date    APPENDECTOMY  1976    CARDIAC CATHETERIZATION  02/19/2016    no blockages, no interventions    CHOLECYSTECTOMY  11/2012    COLONOSCOPY      COLONOSCOPY      COLONOSCOPY      DIAGNOSTIC CARDIAC CATH LAB PROCEDURE  02/09/2016    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     HERNIA REPAIR  07/2012    TONSILLECTOMY AND ADENOIDECTOMY  1974    UROLOGICAL SURGERY  1984    correction of right testicular torsion    UROLOGICAL SURGERY      varicocelectomy    VASECTOMY  2005    VEIN SURGERY  2005     Family History   Problem Relation Age of Onset    Hypertension Father     Cancer Mother         uterine/ovarian    Heart Disease Brother         PFO    Stroke Brother         TIA / had PFO    Anxiety Disorder Father     Other Father 58        supranuclear palsy      Social History     Tobacco Use    Smoking status: Never    Smokeless tobacco: Never   Substance Use Topics    Alcohol use: No       ROS:    Review of Systems   Constitutional: Negative for chills, decreased appetite, diaphoresis, fever and malaise/fatigue. HENT:  Negative for congestion, hearing loss, hoarse voice and nosebleeds. Eyes:  Negative for blurred vision. Cardiovascular:  Positive for chest pain, irregular heartbeat and palpitations. Negative for claudication, cyanosis, dyspnea on exertion, leg swelling, near-syncope, orthopnea, paroxysmal nocturnal dyspnea and syncope. Respiratory:  Negative for cough, hemoptysis, shortness of breath, sputum production and wheezing. Endocrine: Negative for polydipsia, polyphagia and polyuria. Skin:  Negative for color change. Musculoskeletal:  Negative for back pain. Gastrointestinal:  Negative for abdominal pain, bowel incontinence, diarrhea, hematemesis, hematochezia, nausea and vomiting. Genitourinary:  Negative for dysuria, frequency and hematuria. Neurological:  Negative for dizziness, focal weakness, headaches, light-headedness, loss of balance, numbness, sensory change and weakness. Psychiatric/Behavioral:  Negative for altered mental status and memory loss. PHYSICAL EXAM:   /84   Pulse 91   Ht 5' 10\" (1.778 m)   Wt 250 lb (113.4 kg)   BMI 35.87 kg/m²      Physical Exam  Constitutional:       Appearance: Normal appearance. HENT:      Head: Normocephalic and atraumatic. Nose: Nose normal.   Eyes:      Extraocular Movements: Extraocular movements intact. Pupils: Pupils are equal, round, and reactive to light. Neck:      Vascular: No carotid bruit. Cardiovascular:      Rate and Rhythm: Regular rhythm. Pulses: Normal pulses. Heart sounds: No murmur heard.   Pulmonary:      Effort: Pulmonary effort is normal.      Breath sounds: Normal breath sounds. Abdominal:      General: Abdomen is flat. Bowel sounds are normal.      Palpations: Abdomen is soft. Musculoskeletal:         General: Normal range of motion. Cervical back: Normal range of motion and neck supple. Skin:     General: Skin is warm and dry. Neurological:      General: No focal deficit present. Mental Status: He is alert and oriented to person, place, and time. Psychiatric:         Mood and Affect: Mood normal.       Medical problems and test results were reviewed with the patient today. Results for orders placed or performed in visit on 11/07/22   EKG 12 Lead    Impression    Sinus  Rhythm   WITHIN NORMAL LIMITS       ASSESSMENT and PLAN    Arleen Tong was seen today for chest pain and new patient. Diagnoses and all orders for this visit:    Chest pain:Supect symptoamtic PVC's based on clinical description. Place a 1 week extended holter monitor to investigate symptoms. Consder MPI scan if holter monitor is negative. -     EKG 12 Lead  -     Extended cardiac holter monitor (48 hrs - 15 days); Future    Essential hypertension:Mildly elevated on Hyzaar. Continue Hyzaar for now. Monitor and log home BP. I discuss results on follow up visit. Palpitation  -     Extended cardiac holter monitor (48 hrs - 15 days); Future        Disposition:    Return in about 3 weeks (around 11/28/2022) for Follow up after procedure.                 Bea Cho MD  11/7/2022  9:01 AM

## 2022-11-09 ENCOUNTER — TELEPHONE (OUTPATIENT)
Dept: CARDIOLOGY CLINIC | Age: 62
End: 2022-11-09

## 2022-11-09 DIAGNOSIS — R06.02 SOB (SHORTNESS OF BREATH): ICD-10-CM

## 2022-11-09 DIAGNOSIS — G47.33 OSA (OBSTRUCTIVE SLEEP APNEA): ICD-10-CM

## 2022-11-09 DIAGNOSIS — R00.2 PALPITATION: Primary | ICD-10-CM

## 2022-11-09 DIAGNOSIS — I10 ESSENTIAL HYPERTENSION: ICD-10-CM

## 2022-11-09 NOTE — TELEPHONE ENCOUNTER
Pt states he saw Dr. Pam Moreland on Monday, thinks he has PVCs. Woke up this am around 6, with c/o SOB. C/o pain in the middle of his back all day. States SOB when he woke up was 5/10. Better now. Back pain 4-5/10. Has been having palpitations. Pushes the button for his holter monitor when he has these.

## 2022-11-09 NOTE — TELEPHONE ENCOUNTER
Reviewed pt's concerns with Dr. Heike Qureshi. States have pt leave monitor on, schedule for stress test. Order placed for stress test. Pt made aware. Verb understanding.

## 2022-12-09 ENCOUNTER — TELEPHONE (OUTPATIENT)
Dept: CARDIOLOGY CLINIC | Age: 62
End: 2022-12-09

## 2022-12-12 NOTE — TELEPHONE ENCOUNTER
Informed pt that Dr. Blaine Baker stated that the stress test was normal. Pt stated that he would still like an appointment.  Pt has an appointment scheduled for 12/14/22 at 8:15

## 2022-12-14 ENCOUNTER — OFFICE VISIT (OUTPATIENT)
Dept: CARDIOLOGY CLINIC | Age: 62
End: 2022-12-14
Payer: MEDICARE

## 2022-12-14 VITALS
BODY MASS INDEX: 35.36 KG/M2 | SYSTOLIC BLOOD PRESSURE: 130 MMHG | DIASTOLIC BLOOD PRESSURE: 82 MMHG | HEART RATE: 92 BPM | WEIGHT: 247 LBS | HEIGHT: 70 IN

## 2022-12-14 DIAGNOSIS — I49.3 SYMPTOMATIC PVCS: ICD-10-CM

## 2022-12-14 DIAGNOSIS — I10 ESSENTIAL HYPERTENSION: Primary | ICD-10-CM

## 2022-12-14 PROCEDURE — G8427 DOCREV CUR MEDS BY ELIG CLIN: HCPCS | Performed by: INTERNAL MEDICINE

## 2022-12-14 PROCEDURE — 3017F COLORECTAL CA SCREEN DOC REV: CPT | Performed by: INTERNAL MEDICINE

## 2022-12-14 PROCEDURE — 3078F DIAST BP <80 MM HG: CPT | Performed by: INTERNAL MEDICINE

## 2022-12-14 PROCEDURE — G8484 FLU IMMUNIZE NO ADMIN: HCPCS | Performed by: INTERNAL MEDICINE

## 2022-12-14 PROCEDURE — G8417 CALC BMI ABV UP PARAM F/U: HCPCS | Performed by: INTERNAL MEDICINE

## 2022-12-14 PROCEDURE — 3075F SYST BP GE 130 - 139MM HG: CPT | Performed by: INTERNAL MEDICINE

## 2022-12-14 PROCEDURE — 99213 OFFICE O/P EST LOW 20 MIN: CPT | Performed by: INTERNAL MEDICINE

## 2022-12-14 PROCEDURE — 1036F TOBACCO NON-USER: CPT | Performed by: INTERNAL MEDICINE

## 2022-12-14 RX ORDER — METOPROLOL SUCCINATE 25 MG/1
25 TABLET, EXTENDED RELEASE ORAL DAILY
Qty: 30 TABLET | Refills: 5 | Status: SHIPPED | OUTPATIENT
Start: 2022-12-14

## 2022-12-14 ASSESSMENT — ENCOUNTER SYMPTOMS
ORTHOPNEA: 0
HEMATEMESIS: 0
VOMITING: 0
HEMATOCHEZIA: 0
DIARRHEA: 0
BLURRED VISION: 0
COLOR CHANGE: 0
ABDOMINAL PAIN: 0
COUGH: 0
SHORTNESS OF BREATH: 0
HOARSE VOICE: 0
BOWEL INCONTINENCE: 0
NAUSEA: 0
WHEEZING: 0
SPUTUM PRODUCTION: 0

## 2022-12-14 NOTE — PROGRESS NOTES
[facility] CARDIOLOGY  [address], [address]  PHONE: [phone]        22        NAME:  Odessia Opitz  : 1960  MRN: [id number]       SUBJECTIVE:   Odessia Opitz is a 65 y.o. male seen for a follow up visit regarding the following: The patient has a hx of unexplained chest pain. Previous cardiac cath failed to demonstrate significant CAD ( anomalous LCX ) was described. Last month,he reported occasional brief atypical chest pain. Follow up Strang was normal ( nl perfusion & LV EF). Follow ambulatory ECG monitor demonstrated  rare symptomatic PVC's and symptomatic sinus tachycardia. He returns for follow up. I discussed test results with the patient. Chief Complaint   Patient presents with    Hypertension    Results     NST AND MONITOR RESULTS       HPI:    Palpitations   This is a recurrent problem. The problem occurs rarely. The problem has been resolved. Associated symptoms include chest pain. Pertinent negatives include no anxiety, chest fullness, coughing, diaphoresis, dizziness, fever, irregular heartbeat, malaise/fatigue, nausea, near-syncope, numbness, shortness of breath, syncope, vomiting or weakness. Past Medical History, Past Surgical History, Family history, Social History, and Medications were all reviewed with the patient today and updated as necessary.          Current Outpatient Medications:     metoprolol succinate (TOPROL XL) 25 MG extended release tablet, Take 1 tablet by mouth daily, Disp: 30 tablet, Rfl: 5    lubiprostone (AMITIZA) 24 MCG capsule, lubiprostone 24 mcg capsule  Take 1 capsule twice a day by oral route., Disp: , Rfl:     amitriptyline (ELAVIL) 25 MG tablet, Take 25 mg by mouth, Disp: , Rfl:     losartan-hydroCHLOROthiazide (HYZAAR) 100-25 MG per tablet, Take 1 tablet by mouth daily, Disp: , Rfl:     meclizine (ANTIVERT) 25 MG tablet, Take by mouth 3 times daily as needed, Disp: , Rfl:   Allergies   Allergen Reactions Pregabalin Other (See Comments)    Prednisone Other (See Comments)     Past Medical History:   Diagnosis Date    Anxiety and depression     Attention deficit disorder     Chronic constipation     Chronic pain     fibromyalgia    Colon polyps 2013, 2018    repeat colonoscopy every 5 yrs    Diabetes ([facility] [address]     metformin previously-now diet controlled- 8/2019  A1C 5.9 (patient reports)- does not check BS regularly-  hypo less than 80 (when taking metformin)    Dyslipidemia     Essential hypertension     managed with meds    Fibromyalgia     Hematuria, microscopic     Hypertrophy of prostate with urinary obstruction and other lower urinary tract symptoms (LUTS)     Hypogonadism, testicular     Irritable bowel syndrome     Obesity     Obesity (BMI 30.0-34.9) 01/21/2020    BMI 34.26    Obstructive sleep apnea on CPAP     uses CPAP    PONV (postoperative nausea and vomiting)     Prediabetes     PUD (peptic ulcer disease) 1970s    Symptomatic PVCs 12/14/2022    Ventral hernia      Past Surgical History:   Procedure Laterality Date    APPENDECTOMY  1976    CARDIAC CATHETERIZATION  02/19/2016    no blockages, no interventions    CHOLECYSTECTOMY  11/2012    COLONOSCOPY      COLONOSCOPY      COLONOSCOPY      DIAGNOSTIC CARDIAC CATH LAB PROCEDURE  02/09/2016    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 HERNIA REPAIR  07/2012    TONSILLECTOMY AND ADENOIDECTOMY  1974    UROLOGICAL SURGERY  1984    correction of right testicular torsion    UROLOGICAL SURGERY      varicocelectomy    VASECTOMY  2005    VEIN SURGERY  2005     Family History   Problem Relation Age of Onset    Hypertension Father     Cancer Mother         uterine/ovarian    Heart Disease Brother         PFO    Stroke Brother         TIA / had PFO    Anxiety Disorder Father     Other Father 58        supranuclear palsy      Social History     Tobacco Use    Smoking status: Never    Smokeless tobacco: Never   Substance Use Topics    Alcohol use:  No ROS:    Review of Systems   Constitutional: Negative for chills, decreased appetite, diaphoresis, fever and malaise/fatigue. HENT:  Negative for congestion, hearing loss, hoarse voice and nosebleeds. Eyes:  Negative for blurred vision. Cardiovascular:  Positive for chest pain and palpitations. Negative for claudication, cyanosis, dyspnea on exertion, irregular heartbeat, leg swelling, near-syncope, orthopnea, paroxysmal nocturnal dyspnea and syncope. Respiratory:  Negative for cough, shortness of breath, sputum production and wheezing. Endocrine: Negative for polydipsia, polyphagia and polyuria. Skin:  Negative for color change. Gastrointestinal:  Negative for abdominal pain, bowel incontinence, diarrhea, hematemesis, hematochezia, nausea and vomiting. Genitourinary:  Negative for dysuria, frequency and hematuria. Neurological:  Negative for dizziness, focal weakness, light-headedness, loss of balance, numbness, sensory change and weakness. Psychiatric/Behavioral:  Negative for altered mental status and memory loss. The patient is not nervous/anxious. PHYSICAL EXAM:   /82   Pulse 92   Ht 5' 10\" (1.778 m)   Wt 247 lb (112 kg)   BMI 35.44 kg/m²      Physical Exam  Constitutional:       Appearance: Normal appearance. HENT:      Head: Normocephalic and atraumatic. Nose: Nose normal.   Eyes:      Extraocular Movements: Extraocular movements intact. Pupils: Pupils are equal, round, and reactive to light. Neck:      Vascular: No carotid bruit. Cardiovascular:      Rate and Rhythm: Regular rhythm. Pulses: Normal pulses. Heart sounds: No murmur heard. Pulmonary:      Effort: Pulmonary effort is normal.      Breath sounds: Normal breath sounds. Abdominal:      General: Abdomen is flat. Bowel sounds are normal.      Palpations: Abdomen is soft. Musculoskeletal:         General: Normal range of motion.       Cervical back: Normal range of motion and neck supple. Skin:     General: Skin is warm and dry. Neurological:      General: No focal deficit present. Mental Status: He is alert and oriented to person, place, and time. Psychiatric:         Mood and Affect: Mood normal.       Medical problems and test results were reviewed with the patient today. Recent Results (from the past 672 hour(s))   Nuclear stress test with myocardial perfusion    Collection Time: 12/05/22 10:21 AM   Result Value Ref Range    Stress Target  bpm    Baseline HR 94 bpm    Baseline Systolic  mmHg    Baseline Diastolic BP 90 mmHg    Stress Peak  bpm    Stress Percent HR Achieved 89 %    Stress Systolic  mmHg    Stress Rate Pressure Product 27,072 bpm*mmHg    Stress Diastolic BP 94 mmHg    Exercise Duration Time 4 min    Exercuse Duration Seconds 1 sec    Body Surface Area 2.37 m2    Baseline ST Depression 0 mm    Stress ST Depression 0 mm    Nuc Stress EF 55 %     No results found for: CHOL, CHOLPOCT, CHOLX, CHLST, CHOLV, HDL, HDLPOC, HDLC, LDL, LDLC, VLDLC, VLDL, TGLX, TRIGL  No results found for any visits on 12/14/22. ASSESSMENT and PLAN    Vernon Villeda was seen today for hypertension and results. Diagnoses and all orders for this visit:    Essential hypertension:Stable and at [place]. Symptomatic PVCs:Try adding low dose BB.  -     metoprolol succinate (TOPROL XL) 25 MG extended release tablet; Take 1 tablet by mouth daily        Disposition:    Return in about 6 months (around 6/14/2023) for Follow up after Med change.                 Chandrika Gurrola MD  12/14/2022  8:56 AM

## 2023-02-08 ENCOUNTER — APPOINTMENT (OUTPATIENT)
Dept: GENERAL RADIOLOGY | Age: 63
End: 2023-02-08
Payer: MEDICARE

## 2023-02-08 ENCOUNTER — APPOINTMENT (OUTPATIENT)
Dept: CT IMAGING | Age: 63
End: 2023-02-08
Payer: MEDICARE

## 2023-02-08 ENCOUNTER — HOSPITAL ENCOUNTER (EMERGENCY)
Age: 63
Discharge: HOME | End: 2023-02-08
Attending: EMERGENCY MEDICINE
Payer: MEDICARE

## 2023-02-08 VITALS
RESPIRATION RATE: 16 BRPM | DIASTOLIC BLOOD PRESSURE: 81 MMHG | HEART RATE: 98 BPM | OXYGEN SATURATION: 99 % | BODY MASS INDEX: 35.07 KG/M2 | TEMPERATURE: 97.9 F | HEIGHT: 70 IN | WEIGHT: 245 LBS | SYSTOLIC BLOOD PRESSURE: 141 MMHG

## 2023-02-08 DIAGNOSIS — K59.00 CONSTIPATION, UNSPECIFIED CONSTIPATION TYPE: Primary | ICD-10-CM

## 2023-02-08 LAB
ALBUMIN SERPL-MCNC: 4.2 G/DL (ref 3.2–4.6)
ALBUMIN/GLOB SERPL: 1.4 (ref 0.4–1.6)
ALP SERPL-CCNC: 101 U/L (ref 50–136)
ALT SERPL-CCNC: 28 U/L (ref 12–65)
ANION GAP SERPL CALC-SCNC: 4 MMOL/L (ref 2–11)
AST SERPL-CCNC: 21 U/L (ref 15–37)
BASOPHILS # BLD: 0 K/UL (ref 0–0.2)
BASOPHILS NFR BLD: 0 % (ref 0–2)
BILIRUB SERPL-MCNC: 0.4 MG/DL (ref 0.2–1.1)
BUN SERPL-MCNC: 18 MG/DL (ref 8–23)
CALCIUM SERPL-MCNC: 9.3 MG/DL (ref 8.3–10.4)
CHLORIDE SERPL-SCNC: 104 MMOL/L (ref 101–110)
CO2 SERPL-SCNC: 31 MMOL/L (ref 21–32)
CREAT SERPL-MCNC: 1.07 MG/DL (ref 0.8–1.5)
DIFFERENTIAL METHOD BLD: ABNORMAL
EOSINOPHIL # BLD: 0 K/UL (ref 0–0.8)
EOSINOPHIL NFR BLD: 1 % (ref 0.5–7.8)
ERYTHROCYTE [DISTWIDTH] IN BLOOD BY AUTOMATED COUNT: 12.7 % (ref 11.9–14.6)
GLOBULIN SER CALC-MCNC: 3.1 G/DL (ref 2.8–4.5)
GLUCOSE SERPL-MCNC: 107 MG/DL (ref 65–100)
HCT VFR BLD AUTO: 42.4 % (ref 41.1–50.3)
HGB BLD-MCNC: 14.4 G/DL (ref 13.6–17.2)
IMM GRANULOCYTES # BLD AUTO: 0 K/UL (ref 0–0.5)
IMM GRANULOCYTES NFR BLD AUTO: 0 % (ref 0–5)
LIPASE SERPL-CCNC: 76 U/L (ref 73–393)
LYMPHOCYTES # BLD: 1.5 K/UL (ref 0.5–4.6)
LYMPHOCYTES NFR BLD: 23 % (ref 13–44)
MCH RBC QN AUTO: 29 PG (ref 26.1–32.9)
MCHC RBC AUTO-ENTMCNC: 34 G/DL (ref 31.4–35)
MCV RBC AUTO: 85.5 FL (ref 82–102)
MONOCYTES # BLD: 0.4 K/UL (ref 0.1–1.3)
MONOCYTES NFR BLD: 7 % (ref 4–12)
NEUTS SEG # BLD: 4.3 K/UL (ref 1.7–8.2)
NEUTS SEG NFR BLD: 69 % (ref 43–78)
NRBC # BLD: 0 K/UL (ref 0–0.2)
PLATELET # BLD AUTO: 177 K/UL (ref 150–450)
PMV BLD AUTO: 9.3 FL (ref 9.4–12.3)
POTASSIUM SERPL-SCNC: 3.6 MMOL/L (ref 3.5–5.1)
PROT SERPL-MCNC: 7.3 G/DL (ref 6.3–8.2)
RBC # BLD AUTO: 4.96 M/UL (ref 4.23–5.6)
SODIUM SERPL-SCNC: 139 MMOL/L (ref 133–143)
WBC # BLD AUTO: 6.3 K/UL (ref 4.3–11.1)

## 2023-02-08 PROCEDURE — 85025 COMPLETE CBC W/AUTO DIFF WBC: CPT

## 2023-02-08 PROCEDURE — 99285 EMERGENCY DEPT VISIT HI MDM: CPT

## 2023-02-08 PROCEDURE — 83690 ASSAY OF LIPASE: CPT

## 2023-02-08 PROCEDURE — 80053 COMPREHEN METABOLIC PANEL: CPT

## 2023-02-08 PROCEDURE — 74177 CT ABD & PELVIS W/CONTRAST: CPT

## 2023-02-08 PROCEDURE — 74018 RADEX ABDOMEN 1 VIEW: CPT

## 2023-02-08 PROCEDURE — 6360000004 HC RX CONTRAST MEDICATION: Performed by: PHYSICIAN ASSISTANT

## 2023-02-08 RX ORDER — POLYETHYLENE GLYCOL 3350, SODIUM CHLORIDE, POTASSIUM CHLORIDE, SODIUM BICARBONATE, AND SODIUM SULFATE 240; 5.84; 2.98; 6.72; 22.72 G/4L; G/4L; G/4L; G/4L; G/4L
4000 POWDER, FOR SOLUTION ORAL ONCE
Qty: 4000 ML | Refills: 0 | Status: SHIPPED | OUTPATIENT
Start: 2023-02-08 | End: 2023-02-08

## 2023-02-08 RX ADMIN — IOPAMIDOL 100 ML: 755 INJECTION, SOLUTION INTRAVENOUS at 16:26

## 2023-02-08 ASSESSMENT — ENCOUNTER SYMPTOMS
ABDOMINAL PAIN: 1
CONSTIPATION: 1
RESPIRATORY NEGATIVE: 1

## 2023-02-08 NOTE — ED NOTES
Returned from CT; sitting in bed alert, resp easy, NAD.       Abby Salazar, SYLVESTER  02/08/23 [id number]

## 2023-02-08 NOTE — ED NOTES
Pt states last BM 8 days ago, c/o tenderness in lower abd; denies further symptoms. States began when he started a special diet that has him eating 6 small meals of shakes or bars.       Rony Ventura RN  02/08/23 1695

## 2023-02-08 NOTE — ED PROVIDER NOTES
Emergency Department Provider Note                   PCP:                Farzad Houston MD               Age: 65 y.o. Sex: male       ICD-10-CM    1. Constipation, unspecified constipation type  K59.00 [facility]          DISPOSITION Decision To Discharge 02/08/2023 06:07:50 PM        Medical Decision Making  Patient is a 71-year-old male who presents with constipation for 8 days. Has been passing gas but stool. Some lower abdominal pain. Mildly tender in the left lower quadrant and suprapubic. Labs without significant abnormality. Urine clear. CT shows constipation but no other acute abnormalities. He has been taking MiraLAX and over-the-counter enemas without much improvement. Milk of molasses enema given with some results. He is feeling better. Will prescribe GoLytely as he still feels like there is some that needs to come out. He is requesting referral to GI as he has not seen them in 5 years. He is to return if worsening. Amount and/or Complexity of Data Reviewed  Labs: ordered. Radiology: ordered. Risk  Prescription drug management. Orders Placed This Encounter   Procedures    XR ABDOMEN (KUB) (SINGLE AP VIEW)    CT ABDOMEN PELVIS W IV CONTRAST Additional Contrast? None    CBC with Auto Differential    CMP    Lipase    [facility]    POCT Urine Dipstick    ENEMA MILK AND MOLASSES        Medications   iopamidol (ISOVUE-370) 76 % injection 100 mL (100 mLs IntraVENous Given 2/8/23 1626)       New Prescriptions    POLYETHYLENE GLYCOL-ELECTROLYTES (COLYTE) 240 G SOLR SOLUTION    Take 4,000 mLs by mouth once for 1 dose        Linda Carrera is a 65 y.o. male who presents to the Emergency Department with chief complaint of    Chief Complaint   Patient presents with    Constipation      Patient is a 71-year-old male who presents with constipation for 8 days.   He has been taking MiraLAX now for 3 days and used an enema last night without any improvement. States he has had constipation in the past requiring enemas in the emergency department. He does endorse left lower abdominal pain. No fevers or urinary complaints. No vomiting. Says he did change his diet recently which he believes may be contributing. Review of Systems   Constitutional: Negative. HENT: Negative. Respiratory: Negative. Cardiovascular: Negative. Gastrointestinal:  Positive for abdominal pain and constipation. Genitourinary: Negative. All other systems reviewed and are negative. Past Medical History:   Diagnosis Date    Anxiety and depression     Attention deficit disorder     Chronic constipation     Chronic pain     fibromyalgia    Colon polyps 2013, 2018    repeat colonoscopy every 5 yrs    Diabetes ([facility] [address]     metformin previously-now diet controlled- 8/2019  A1C 5.9 (patient reports)- does not check BS regularly-  hypo less than 80 (when taking metformin)    Dyslipidemia     Essential hypertension     managed with meds    Fibromyalgia     Hematuria, microscopic     Hypertrophy of prostate with urinary obstruction and other lower urinary tract symptoms (LUTS)     Hypogonadism, testicular     Irritable bowel syndrome     Obesity     Obesity (BMI 30.0-34.9) 01/21/2020    BMI 34.26    Obstructive sleep apnea on CPAP     uses CPAP    PONV (postoperative nausea and vomiting)     Prediabetes     PUD (peptic ulcer disease) 1970s    Symptomatic PVCs 12/14/2022    Ventral hernia         Past Surgical History:   Procedure Laterality Date    APPENDECTOMY  1976    CARDIAC CATHETERIZATION  02/19/2016    no blockages, no interventions    CHOLECYSTECTOMY  11/2012    COLONOSCOPY      COLONOSCOPY      COLONOSCOPY      DIAGNOSTIC CARDIAC CATH LAB PROCEDURE  02/09/2016    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     HERNIA REPAIR  07/2012    TONSILLECTOMY AND ADENOIDECTOMY  1974    UROLOGICAL SURGERY  1984    correction of right testicular torsion    UROLOGICAL SURGERY      varicocelectomy    VASECTOMY  2005    VEIN SURGERY  2005        Family History   Problem Relation Age of Onset    Hypertension Father     Cancer Mother         uterine/ovarian    Heart Disease Brother         PFO    Stroke Brother         TIA / had PFO    Anxiety Disorder Father     Other Father 58        supranuclear palsy        Social History     Socioeconomic History    Marital status:      Spouse name: None    Number of children: None    Years of education: None    Highest education level: None   Tobacco Use    Smoking status: Never    Smokeless tobacco: Never   Substance and Sexual Activity    Alcohol use: No    Drug use: No         Pregabalin and Prednisone     Previous Medications    AMITRIPTYLINE (ELAVIL) 25 MG TABLET    Take 25 mg by mouth    LOSARTAN-HYDROCHLOROTHIAZIDE (HYZAAR) 100-25 MG PER TABLET    Take 1 tablet by mouth daily    LUBIPROSTONE (AMITIZA) 24 MCG CAPSULE    lubiprostone 24 mcg capsule   Take 1 capsule twice a day by oral route. MECLIZINE (ANTIVERT) 25 MG TABLET    Take by mouth 3 times daily as needed    METOPROLOL SUCCINATE (TOPROL XL) 25 MG EXTENDED RELEASE TABLET    Take 1 tablet by mouth daily        Vitals signs and nursing note reviewed. No data found. Physical Exam  Vitals and nursing note reviewed. Constitutional:       General: He is not in acute distress. Appearance: Normal appearance. He is normal weight. He is not ill-appearing or toxic-appearing. HENT:      Head: Normocephalic. Eyes:      Extraocular Movements: Extraocular movements intact. Cardiovascular:      Rate and Rhythm: Normal rate and regular rhythm. Pulses: Normal pulses. Heart sounds: Normal heart sounds. Pulmonary:      Effort: Pulmonary effort is normal.      Breath sounds: Normal breath sounds. Abdominal:      Palpations: Abdomen is soft. Tenderness: There is abdominal tenderness. There is no guarding or rebound. Comments: Mildly tender in left lower quadrant and suprapubic region. No guarding or rebound. Musculoskeletal:         General: No swelling or tenderness. Normal range of motion. Cervical back: Normal range of motion and neck supple. Skin:     General: Skin is warm and dry. Findings: No rash. Neurological:      General: No focal deficit present. Mental Status: He is alert and oriented to person, place, and time. Mental status is at baseline. Psychiatric:         Mood and Affect: Mood normal.         Behavior: Behavior normal.         Thought Content: Thought content normal.        Procedures    Results for orders placed or performed during the hospital encounter of 02/08/23   XR ABDOMEN (KUB) (SINGLE AP VIEW)    Narrative    XR ABDOMEN (KUB) (SINGLE AP VIEW) 2/8/2023 1:02 PM    HISTORY: 8 days of constipation and lower abdominal pain. COMPARISON: None available. Single supine view of the abdomen was obtained. Impression    The bowel gas pattern is normal. No bowel dilatation to suggest  obstruction. There is no free air visualized on this supine view. Mild  constipation. Fecal debris noted in the ascending and descending colon. There  are no definite renal calculi. The lung bases are clear where visualized. CT ABDOMEN PELVIS W IV CONTRAST Additional Contrast? None    Narrative    CT ABDOMEN PELVIS W IV CONTRAST 2/8/2023 4:34 PM    HISTORY: Lower abdominal pain and constipation. COMPARISON: None available. TECHNIQUE: Multiple axial images were obtained through the abdomen and pelvis. Oral contrast was used for bowel opacification. 100 mL of Isovue 370  intravenous contrast was used for better evaluation of solid organs and vascular  structures. Radiation dose reduction techniques were used for this study.   All  CT scans performed at this facility use one or all of the following: Automated  exposure control, adjustment of the mA and/or kVp according to patient's size,  iterative reconstruction. FINDINGS:    LOWER CHEST: Normal.    HEPATOBILIARY: Liver is unremarkable. Cholecystectomy. PANCREAS: Normal.    SPLEEN: Normal.    ADRENAL GLANDS: Normal.    KIDNEYS/BLADDER: Kidneys and bladder are unremarkable. No hydronephrosis or  calculi. BOWEL: No evidence of bowel obstruction. Mild to moderate constipation. No  diverticulitis. Appendix not visualized. LYMPH NODES: No enlarged nodes. VASCULATURE: Unremarkable. PELVIC ORGANS: Prostate and rectum are unremarkable. No pelvic free fluid. MUSCULOSKELETAL: Degenerative spine and hip changes are present. Impression    Mild-to-moderate constipation. No bowel obstruction or  diverticulitis. Appendix not visualized.      CBC with Auto Differential   Result Value Ref Range    WBC 6.3 4.3 - 11.1 K/uL    RBC 4.96 4.23 - 5.6 M/uL    Hemoglobin 14.4 13.6 - 17.2 g/dL    Hematocrit 42.4 41.1 - 50.3 %    MCV 85.5 82.0 - 102.0 FL    MCH 29.0 26.1 - 32.9 PG    MCHC 34.0 31.4 - 35.0 g/dL    RDW 12.7 11.9 - 14.6 %    Platelets 210 003 - 382 K/uL    MPV 9.3 (L) 9.4 - 12.3 FL    nRBC 0.00 0.0 - 0.2 K/uL    Differential Type AUTOMATED      Seg Neutrophils 69 43 - 78 %    Lymphocytes 23 13 - 44 %    Monocytes 7 4.0 - 12.0 %    Eosinophils % 1 0.5 - 7.8 %    Basophils 0 0.0 - 2.0 %    Immature Granulocytes 0 0.0 - 5.0 %    Segs Absolute 4.3 1.7 - 8.2 K/UL    Absolute Lymph # 1.5 0.5 - 4.6 K/UL    Absolute Mono # 0.4 0.1 - 1.3 K/UL    Absolute Eos # 0.0 0.0 - 0.8 K/UL    Basophils Absolute 0.0 0.0 - 0.2 K/UL    Absolute Immature Granulocyte 0.0 0.0 - 0.5 K/UL   CMP   Result Value Ref Range    Sodium 139 133 - 143 mmol/L    Potassium 3.6 3.5 - 5.1 mmol/L    Chloride 104 101 - 110 mmol/L    CO2 31 21 - 32 mmol/L    Anion Gap 4 2 - 11 mmol/L    Glucose 107 (H) 65 - 100 mg/dL    BUN 18 8 - 23 MG/DL    Creatinine 1.07 0.8 - 1.5 MG/DL    Est, Glom Filt Rate >60 >60 ml/min/1.73m2    Calcium 9.3 8.3 - 10.4 MG/DL    Total Bilirubin 0.4 0.2 - 1.1 MG/DL    ALT 28 12 - 65 U/L    AST 21 15 - 37 U/L    Alk Phosphatase 101 50 - 136 U/L    Total Protein 7.3 6.3 - 8.2 g/dL    Albumin 4.2 3.2 - 4.6 g/dL    Globulin 3.1 2.8 - 4.5 g/dL    Albumin/Globulin Ratio 1.4 0.4 - 1.6     Lipase   Result Value Ref Range    Lipase 76 73 - 393 U/L        CT ABDOMEN PELVIS W IV CONTRAST Additional Contrast? None   Final Result   Mild-to-moderate constipation. No bowel obstruction or   diverticulitis. Appendix not visualized.         XR ABDOMEN (KUB) (SINGLE AP VIEW)   Final Result   The bowel gas pattern is normal. No bowel dilatation to suggest   obstruction.  There is no free air visualized on this supine view. Mild   constipation. Fecal debris noted in the ascending and descending colon. There   are no definite renal calculi.  The lung bases are clear where visualized.                          Voice dictation software was used during the making of this note.  This software is not perfect and grammatical and other typographical errors may be present.  This note has not been completely proofread for errors.       NIRALI Win  02/08/23 4040

## 2023-02-09 NOTE — ED NOTES
Pt states large amount of \"somewhat\" formed BM resulting from enema.       Marquis Malia RN  02/08/23 1911

## 2023-04-19 DIAGNOSIS — N40.0 BENIGN PROSTATIC HYPERPLASIA WITHOUT LOWER URINARY TRACT SYMPTOMS: Primary | ICD-10-CM

## 2023-04-20 ENCOUNTER — NURSE ONLY (OUTPATIENT)
Dept: UROLOGY | Age: 63
End: 2023-04-20

## 2023-04-20 DIAGNOSIS — N40.0 BENIGN PROSTATIC HYPERPLASIA WITHOUT LOWER URINARY TRACT SYMPTOMS: ICD-10-CM

## 2023-04-20 LAB — PSA SERPL-MCNC: 1 NG/ML

## 2023-04-28 ENCOUNTER — OFFICE VISIT (OUTPATIENT)
Dept: UROLOGY | Age: 63
End: 2023-04-28

## 2023-04-28 DIAGNOSIS — R10.32 LEFT GROIN PAIN: ICD-10-CM

## 2023-04-28 DIAGNOSIS — R39.9 LOWER URINARY TRACT SYMPTOMS (LUTS): ICD-10-CM

## 2023-04-28 DIAGNOSIS — N40.0 BENIGN PROSTATIC HYPERPLASIA WITHOUT LOWER URINARY TRACT SYMPTOMS: Primary | ICD-10-CM

## 2023-04-28 DIAGNOSIS — Z12.5 SCREENING PSA (PROSTATE SPECIFIC ANTIGEN): ICD-10-CM

## 2023-04-28 DIAGNOSIS — N41.1 CHRONIC PROSTATITIS: ICD-10-CM

## 2023-04-28 LAB
BILIRUBIN, URINE, POC: NEGATIVE
BLOOD URINE, POC: NORMAL
GLUCOSE URINE, POC: NEGATIVE
KETONES, URINE, POC: NEGATIVE
LEUKOCYTE ESTERASE, URINE, POC: NEGATIVE
NITRITE, URINE, POC: NEGATIVE
PH, URINE, POC: 6 (ref 4.6–8)
PROTEIN,URINE, POC: NEGATIVE
PVR, POC: 398 CC
SPECIFIC GRAVITY, URINE, POC: 1.01 (ref 1–1.03)
URINALYSIS CLARITY, POC: NORMAL
URINALYSIS COLOR, POC: NORMAL
UROBILINOGEN, POC: NORMAL

## 2023-04-28 RX ORDER — TAMSULOSIN HYDROCHLORIDE 0.4 MG/1
0.4 CAPSULE ORAL
Qty: 90 CAPSULE | Refills: 1 | Status: SHIPPED | OUTPATIENT
Start: 2023-04-28

## 2023-04-28 ASSESSMENT — ENCOUNTER SYMPTOMS
BACK PAIN: 0
NAUSEA: 0

## 2023-04-28 NOTE — PROGRESS NOTES
[facility]  [address]    [address], [address] [facility]  [phone]          Radha Fernandez  : 1960    Chief Complaint   Patient presents with    Follow-up          HPI     Radha Fernandez is a 65 y.o. male with a history of BPH/LUTS refractory to medications who is s/p TURP on 20. Returns for follow up today. Seen 2022 and 2022 for prostatitis. He was treated with 4 weeks cipro at that time with resolution of symptoms. Today, he reports doing well. Just had colonoscopy and developed L groin pain afterwards likely from positioning. No prostatitis flairs since 2022. He is on elavil. No urgency, frequency, dysuria, hematuria. No retention. No incontinence. Not on any prostate/bladder meds. U/A negative for UTI today. Denies known cause. Stream is not weak. Empties well. Unable to tolerate pills. Prefers liquid antibiotic. PVR: 398 cc today UP from 34 cc last visit.   He denies significant symptoms from elevated PVR    Lab Results   Component Value Date    PSA 1.0 2023       Past Medical History:   Diagnosis Date    Anxiety and depression     Attention deficit disorder     Chronic constipation     Chronic pain     fibromyalgia    Colon polyps ,     repeat colonoscopy every 5 yrs    Diabetes ([address]     metformin previously-now diet controlled- 2019  A1C 5.9 (patient reports)- does not check BS regularly-  hypo less than 80 (when taking metformin)    Dyslipidemia     Essential hypertension     managed with meds    Fibromyalgia     Hematuria, microscopic     Hypertrophy of prostate with urinary obstruction and other lower urinary tract symptoms (LUTS)     Hypogonadism, testicular     Irritable bowel syndrome     Obesity     Obesity (BMI 30.0-34.9) 2020    BMI 34.26    Obstructive sleep apnea on CPAP     uses CPAP    PONV (postoperative nausea and vomiting)     Prediabetes     PUD (peptic ulcer disease) 1970s

## 2023-04-30 ASSESSMENT — ENCOUNTER SYMPTOMS
EYE PAIN: 0
ABDOMINAL PAIN: 0
VOMITING: 0
INDIGESTION: 0
SHORTNESS OF BREATH: 0
BLOOD IN STOOL: 0
HEARTBURN: 0
SKIN LESIONS: 0
COUGH: 0
CONSTIPATION: 0
WHEEZING: 0
DIARRHEA: 0
EYE DISCHARGE: 0

## 2023-06-19 ENCOUNTER — OFFICE VISIT (OUTPATIENT)
Age: 63
End: 2023-06-19
Payer: MEDICARE

## 2023-06-19 VITALS
WEIGHT: 230 LBS | DIASTOLIC BLOOD PRESSURE: 76 MMHG | BODY MASS INDEX: 32.93 KG/M2 | HEART RATE: 80 BPM | HEIGHT: 70 IN | SYSTOLIC BLOOD PRESSURE: 126 MMHG

## 2023-06-19 DIAGNOSIS — I10 ESSENTIAL HYPERTENSION: Primary | ICD-10-CM

## 2023-06-19 DIAGNOSIS — I49.3 SYMPTOMATIC PVCS: ICD-10-CM

## 2023-06-19 PROCEDURE — 3074F SYST BP LT 130 MM HG: CPT | Performed by: INTERNAL MEDICINE

## 2023-06-19 PROCEDURE — 3078F DIAST BP <80 MM HG: CPT | Performed by: INTERNAL MEDICINE

## 2023-06-19 PROCEDURE — 1036F TOBACCO NON-USER: CPT | Performed by: INTERNAL MEDICINE

## 2023-06-19 PROCEDURE — G8417 CALC BMI ABV UP PARAM F/U: HCPCS | Performed by: INTERNAL MEDICINE

## 2023-06-19 PROCEDURE — 99213 OFFICE O/P EST LOW 20 MIN: CPT | Performed by: INTERNAL MEDICINE

## 2023-06-19 PROCEDURE — 3017F COLORECTAL CA SCREEN DOC REV: CPT | Performed by: INTERNAL MEDICINE

## 2023-06-19 PROCEDURE — G8427 DOCREV CUR MEDS BY ELIG CLIN: HCPCS | Performed by: INTERNAL MEDICINE

## 2023-06-19 ASSESSMENT — ENCOUNTER SYMPTOMS
BLURRED VISION: 0
HOARSE VOICE: 0
SHORTNESS OF BREATH: 0
COLOR CHANGE: 0
ABDOMINAL PAIN: 0
ORTHOPNEA: 0
HEMATEMESIS: 0
HEMATOCHEZIA: 0
SPUTUM PRODUCTION: 0
WHEEZING: 0
BOWEL INCONTINENCE: 0
DIARRHEA: 0

## 2023-06-19 NOTE — PROGRESS NOTES
[facility]  [address], [address]  PHONE: [phone]        23        NAME:  Quita Mosher  : 1960  MRN: [id number]       SUBJECTIVE:   Quita Mosher is a 59 y.o. male seen for a follow up visit regarding the following: The patient has a hx of unexplained chest pain and symptomatic PVC's. He returns for follow up. Patricia Brush reports doing well. Chief Complaint   Patient presents with    6 Month Follow-Up    Hypertension       HPI:    Hypertension  This is a chronic problem. The problem is unchanged. The problem is controlled. Pertinent negatives include no anxiety, blurred vision, chest pain, headaches, malaise/fatigue, neck pain, orthopnea, palpitations, peripheral edema, PND, shortness of breath or sweats. Past Medical History, Past Surgical History, Family history, Social History, and Medications were all reviewed with the patient today and updated as necessary.          Current Outpatient Medications:     tamsulosin (FLOMAX) 0.4 MG capsule, Take 1 capsule by mouth nightly, Disp: 90 capsule, Rfl: 1    lubiprostone (AMITIZA) 24 MCG capsule, lubiprostone 24 mcg capsule  Take 1 capsule twice a day by oral route., Disp: , Rfl:     amitriptyline (ELAVIL) 25 MG tablet, Take 1 tablet by mouth, Disp: , Rfl:     losartan-hydroCHLOROthiazide (HYZAAR) 100-25 MG per tablet, Take 1 tablet by mouth daily, Disp: , Rfl:     meclizine (ANTIVERT) 25 MG tablet, Take by mouth 3 times daily as needed, Disp: , Rfl:     metoprolol succinate (TOPROL XL) 25 MG extended release tablet, Take 1 tablet by mouth daily (Patient not taking: Reported on 2023), Disp: 30 tablet, Rfl: 5  Allergies   Allergen Reactions    Pregabalin Other (See Comments)    Prednisone Other (See Comments)     Past Medical History:   Diagnosis Date    Anxiety and depression     Attention deficit disorder     Chronic constipation     Chronic pain     fibromyalgia    Colon polyps , 2018

## 2023-07-31 ENCOUNTER — OFFICE VISIT (OUTPATIENT)
Dept: UROLOGY | Age: 63
End: 2023-07-31
Payer: MEDICARE

## 2023-07-31 DIAGNOSIS — N41.1 CHRONIC PROSTATITIS: ICD-10-CM

## 2023-07-31 DIAGNOSIS — N40.0 BENIGN PROSTATIC HYPERPLASIA WITHOUT LOWER URINARY TRACT SYMPTOMS: Primary | ICD-10-CM

## 2023-07-31 LAB
BILIRUBIN, URINE, POC: NEGATIVE
BLOOD URINE, POC: NEGATIVE
GLUCOSE URINE, POC: NEGATIVE
KETONES, URINE, POC: NEGATIVE
LEUKOCYTE ESTERASE, URINE, POC: NEGATIVE
NITRITE, URINE, POC: NEGATIVE
PH, URINE, POC: 7 (ref 4.6–8)
PROTEIN,URINE, POC: NEGATIVE
PVR, POC: 86 CC
SPECIFIC GRAVITY, URINE, POC: 1.01 (ref 1–1.03)
URINALYSIS CLARITY, POC: NORMAL
URINALYSIS COLOR, POC: NORMAL
UROBILINOGEN, POC: NORMAL

## 2023-07-31 PROCEDURE — 99213 OFFICE O/P EST LOW 20 MIN: CPT | Performed by: UROLOGY

## 2023-07-31 PROCEDURE — 3017F COLORECTAL CA SCREEN DOC REV: CPT | Performed by: UROLOGY

## 2023-07-31 PROCEDURE — G8417 CALC BMI ABV UP PARAM F/U: HCPCS | Performed by: UROLOGY

## 2023-07-31 PROCEDURE — 1036F TOBACCO NON-USER: CPT | Performed by: UROLOGY

## 2023-07-31 PROCEDURE — G8427 DOCREV CUR MEDS BY ELIG CLIN: HCPCS | Performed by: UROLOGY

## 2023-07-31 PROCEDURE — 81003 URINALYSIS AUTO W/O SCOPE: CPT | Performed by: UROLOGY

## 2023-07-31 PROCEDURE — 51798 US URINE CAPACITY MEASURE: CPT | Performed by: UROLOGY

## 2023-07-31 ASSESSMENT — ENCOUNTER SYMPTOMS
NAUSEA: 0
BACK PAIN: 0

## 2023-07-31 NOTE — PROGRESS NOTES
[facility]  [address]  [phone]          Jared Romo  : 1960    Chief Complaint   Patient presents with    Follow-up          HPI     Jared Romo is a 59 y.o. male with a history of BPH/LUTS refractory to medications who is s/p TURP on 20. Returns for follow up today. Seen 2022 and 2022 for prostatitis. He was treated with 4 weeks cipro at that time with resolution of symptoms. Today, he reports doing well. Flomax started 2023 for elevated  cc. He feels that flomax is helping esthela. He is on elavil. No urgency, frequency, dysuria, hematuria. No retention. No incontinence. Not on any prostate/bladder meds. U/A negative for UTI today. Denies known cause. Stream is not weak. Empties well. Unable to tolerate pills. Prefers liquid antibiotic.       PVR: 86 cc today    IPSS: 2     Lab Results   Component Value Date    PSA 1.0 2023    PSA 1.3 2022    PSA 1.4 2020       Past Medical History:   Diagnosis Date    Anxiety and depression     Attention deficit disorder     Chronic constipation     Chronic pain     fibromyalgia    Colon polyps ,     repeat colonoscopy every 5 yrs    Diabetes ([address])     metformin previously-now diet controlled- 2019  A1C 5.9 (patient reports)- does not check BS regularly-  hypo less than 80 (when taking metformin)    Dyslipidemia     Essential hypertension     managed with meds    Fibromyalgia     Hematuria, microscopic     Hypertrophy of prostate with urinary obstruction and other lower urinary tract symptoms (LUTS)     Hypogonadism, testicular     Irritable bowel syndrome     Obesity     Obesity (BMI 30.0-34.9) 2020    BMI 34.26    Obstructive sleep apnea on CPAP     uses CPAP    PONV (postoperative nausea and vomiting)     Prediabetes     PUD (peptic ulcer disease) 1970s    Symptomatic PVCs 2022    Ventral hernia      Past Surgical

## 2024-04-25 ENCOUNTER — NURSE ONLY (OUTPATIENT)
Dept: UROLOGY | Age: 64
End: 2024-04-25

## 2024-04-25 DIAGNOSIS — N40.0 BENIGN PROSTATIC HYPERPLASIA WITHOUT LOWER URINARY TRACT SYMPTOMS: ICD-10-CM

## 2024-04-25 LAB — PSA SERPL-MCNC: 0.7 NG/ML (ref 0–4)

## 2024-04-29 NOTE — PROGRESS NOTES
[facility]  [address] [address] [facility]   [address]  [place] [address]  [phone]    Kale Avalos  : 1960    CC: Follow up         HPI     Kale Avalos is a 63 y.o. male with history of BPH/LUTS refractory to medications who is s/p TURP on 20.  Returns for follow up today.     Seen 2022 and 2022 for prostatitis.  He was treated with 4 weeks cipro at that time with resolution of symptoms.     Flomax started 2023 for elevated  cc that went down to 86 cc per last visit. He decided to discontinue Flomax due to resolution of symptoms last visit.     Today, he reports no bothersome LUTS today.  Very pleased with his progress since TURP.  Has been off flomax since 2023.  No recent prostatitis flairs but is on elavil 25 mg QHS for that    Lab Results   Component Value Date    PSA 0.7 2024    PSA 1.0 2023    PSA 1.3 2022    PSA 1.4 2020             Past Medical History:   Diagnosis Date    Anxiety and depression     Attention deficit disorder     Chronic constipation     Chronic pain     fibromyalgia    Colon polyps ,     repeat colonoscopy every 5 yrs    Diabetes ([facility])     metformin previously-now diet controlled- 2019  A1C 5.9 (patient reports)- does not check BS regularly-  hypo less than 80 (when taking metformin)    Dyslipidemia     Essential hypertension     managed with meds    Fibromyalgia     Hematuria, microscopic     Hypertrophy of prostate with urinary obstruction and other lower urinary tract symptoms (LUTS)     Hypogonadism, testicular     Irritable bowel syndrome     Obesity     Obesity (BMI 30.0-34.9) 2020    BMI 34.26    Obstructive sleep apnea on CPAP     uses CPAP    PONV (postoperative nausea and vomiting)     Prediabetes     PUD (peptic ulcer disease) 1970s    Symptomatic PVCs 2022    Ventral hernia      Past Surgical History:   Procedure Laterality Date    APPENDECTOMY      CARDIAC CATHETERIZATION

## 2024-05-01 ENCOUNTER — OFFICE VISIT (OUTPATIENT)
Dept: UROLOGY | Age: 64
End: 2024-05-01
Payer: MEDICARE

## 2024-05-01 DIAGNOSIS — N40.0 BENIGN PROSTATIC HYPERPLASIA WITHOUT LOWER URINARY TRACT SYMPTOMS: Primary | ICD-10-CM

## 2024-05-01 DIAGNOSIS — N41.1 CHRONIC PROSTATITIS: ICD-10-CM

## 2024-05-01 LAB
BILIRUBIN, URINE, POC: NEGATIVE
BLOOD URINE, POC: NEGATIVE
GLUCOSE URINE, POC: NEGATIVE
KETONES, URINE, POC: NEGATIVE
LEUKOCYTE ESTERASE, URINE, POC: NEGATIVE
NITRITE, URINE, POC: NEGATIVE
PH, URINE, POC: 7 (ref 4.6–8)
PROTEIN,URINE, POC: NEGATIVE
PVR, POC: 183 CC
SPECIFIC GRAVITY, URINE, POC: 1.01 (ref 1–1.03)
URINALYSIS CLARITY, POC: NORMAL
URINALYSIS COLOR, POC: NORMAL
UROBILINOGEN, POC: NORMAL

## 2024-05-01 PROCEDURE — 81003 URINALYSIS AUTO W/O SCOPE: CPT | Performed by: UROLOGY

## 2024-05-01 PROCEDURE — G8417 CALC BMI ABV UP PARAM F/U: HCPCS | Performed by: UROLOGY

## 2024-05-01 PROCEDURE — 99213 OFFICE O/P EST LOW 20 MIN: CPT | Performed by: UROLOGY

## 2024-05-01 PROCEDURE — 1036F TOBACCO NON-USER: CPT | Performed by: UROLOGY

## 2024-05-01 PROCEDURE — G8427 DOCREV CUR MEDS BY ELIG CLIN: HCPCS | Performed by: UROLOGY

## 2024-05-01 PROCEDURE — 51798 US URINE CAPACITY MEASURE: CPT | Performed by: UROLOGY

## 2024-05-01 PROCEDURE — 3017F COLORECTAL CA SCREEN DOC REV: CPT | Performed by: UROLOGY

## 2024-05-02 DIAGNOSIS — I49.3 SYMPTOMATIC PVCS: ICD-10-CM

## 2024-05-02 ASSESSMENT — ENCOUNTER SYMPTOMS
EYE PAIN: 0
EYE DISCHARGE: 0
INDIGESTION: 0
BLOOD IN STOOL: 0
COUGH: 0
CONSTIPATION: 0
DIARRHEA: 0
WHEEZING: 0
HEARTBURN: 0
ABDOMINAL PAIN: 0
SHORTNESS OF BREATH: 0
BACK PAIN: 0
SKIN LESIONS: 0
NAUSEA: 0
VOMITING: 0

## 2024-05-02 NOTE — TELEPHONE ENCOUNTER
MEDICATION REFILL REQUEST    Metoprolol Succinate  Dose:  25 mg  Frequency:  QD  Quantity:  90  Days' supply:  90 with refills      Pharmacy Name/Location:  [phone]

## 2024-05-03 RX ORDER — METOPROLOL SUCCINATE 25 MG/1
25 TABLET, EXTENDED RELEASE ORAL DAILY
Qty: 90 TABLET | Refills: 3 | Status: SHIPPED | OUTPATIENT
Start: 2024-05-03

## 2024-06-12 ENCOUNTER — OFFICE VISIT (OUTPATIENT)
Age: 64
End: 2024-06-12
Payer: MEDICARE

## 2024-06-12 VITALS
DIASTOLIC BLOOD PRESSURE: 80 MMHG | WEIGHT: 240 LBS | SYSTOLIC BLOOD PRESSURE: 132 MMHG | HEART RATE: 65 BPM | BODY MASS INDEX: 34.36 KG/M2 | HEIGHT: 70 IN

## 2024-06-12 DIAGNOSIS — I49.3 SYMPTOMATIC PVCS: Primary | ICD-10-CM

## 2024-06-12 DIAGNOSIS — I10 ESSENTIAL HYPERTENSION: ICD-10-CM

## 2024-06-12 PROCEDURE — 3075F SYST BP GE 130 - 139MM HG: CPT | Performed by: INTERNAL MEDICINE

## 2024-06-12 PROCEDURE — 1036F TOBACCO NON-USER: CPT | Performed by: INTERNAL MEDICINE

## 2024-06-12 PROCEDURE — G8417 CALC BMI ABV UP PARAM F/U: HCPCS | Performed by: INTERNAL MEDICINE

## 2024-06-12 PROCEDURE — G8427 DOCREV CUR MEDS BY ELIG CLIN: HCPCS | Performed by: INTERNAL MEDICINE

## 2024-06-12 PROCEDURE — 99214 OFFICE O/P EST MOD 30 MIN: CPT | Performed by: INTERNAL MEDICINE

## 2024-06-12 PROCEDURE — 3017F COLORECTAL CA SCREEN DOC REV: CPT | Performed by: INTERNAL MEDICINE

## 2024-06-12 PROCEDURE — 93000 ELECTROCARDIOGRAM COMPLETE: CPT | Performed by: INTERNAL MEDICINE

## 2024-06-12 PROCEDURE — 3079F DIAST BP 80-89 MM HG: CPT | Performed by: INTERNAL MEDICINE

## 2024-06-12 RX ORDER — METOPROLOL SUCCINATE 25 MG/1
25 TABLET, EXTENDED RELEASE ORAL DAILY
Qty: 90 TABLET | Refills: 3 | Status: SHIPPED | OUTPATIENT
Start: 2024-06-12

## 2024-06-12 ASSESSMENT — ENCOUNTER SYMPTOMS
HEMATOCHEZIA: 0
SPUTUM PRODUCTION: 0
BLURRED VISION: 0
DIARRHEA: 0
COLOR CHANGE: 0
HEMATEMESIS: 0
ABDOMINAL PAIN: 0
HOARSE VOICE: 0
BOWEL INCONTINENCE: 0
SHORTNESS OF BREATH: 0
ORTHOPNEA: 0
WHEEZING: 0

## 2024-06-12 NOTE — PROGRESS NOTES
[facility]  [address] [place] [address], [address]  [place] [address]  PHONE: [phone]        24        NAME:  Kale Avalos  : 1960  MRN: [id number]       SUBJECTIVE:   Kale Avalos is a 64 y.o. male seen for a follow up visit regarding the following: The patient has a hx of PVC's & primary hypertension & unexplained chest pain(normal Lexiscan on 2022).He returns for annual follow up.Overall,he reports doing well.    Chief Complaint   Patient presents with    Hypertension    PVC    Chest Pain       HPI:    Hypertension  This is a chronic problem. The problem is unchanged. The problem is controlled. Associated symptoms include palpitations (rare and improved). Pertinent negatives include no anxiety, blurred vision, chest pain, headaches, malaise/fatigue, neck pain, orthopnea, peripheral edema, PND, shortness of breath or sweats.       Past Medical History, Past Surgical History, Family history, Social History, and Medications were all reviewed with the patient today and updated as necessary.         Current Outpatient Medications:     Multiple Vitamins-Minerals (AIRBORNE PO), Take by mouth, Disp: , Rfl:     Ascorbic Acid (VITAMIN C PO), Take by mouth, Disp: , Rfl:     metoprolol succinate (TOPROL XL) 25 MG extended release tablet, Take 1 tablet by mouth daily, Disp: 90 tablet, Rfl: 3    lubiprostone (AMITIZA) 24 MCG capsule, lubiprostone 24 mcg capsule  Take 1 capsule twice a day by oral route., Disp: , Rfl:     amitriptyline (ELAVIL) 25 MG tablet, Take 1 tablet by mouth, Disp: , Rfl:     losartan-hydroCHLOROthiazide (HYZAAR) 100-25 MG per tablet, Take 1 tablet by mouth daily, Disp: , Rfl:     meclizine (ANTIVERT) 25 MG tablet, Take by mouth 3 times daily as needed, Disp: , Rfl:     tamsulosin (FLOMAX) 0.4 MG capsule, Take 1 capsule by mouth nightly, Disp: 90 capsule, Rfl: 1  Allergies   Allergen Reactions    Pregabalin Other (See Comments)    Prednisone Other (See Comments)

## 2024-07-17 ENCOUNTER — OFFICE VISIT (OUTPATIENT)
Dept: UROLOGY | Age: 64
End: 2024-07-17
Payer: MEDICARE

## 2024-07-17 DIAGNOSIS — N41.1 CHRONIC PROSTATITIS: ICD-10-CM

## 2024-07-17 DIAGNOSIS — N40.0 BENIGN PROSTATIC HYPERPLASIA WITHOUT LOWER URINARY TRACT SYMPTOMS: Primary | ICD-10-CM

## 2024-07-17 LAB
BILIRUBIN, URINE, POC: NEGATIVE
BLOOD URINE, POC: NORMAL
GLUCOSE URINE, POC: NEGATIVE
KETONES, URINE, POC: NEGATIVE
LEUKOCYTE ESTERASE, URINE, POC: NEGATIVE
NITRITE, URINE, POC: NEGATIVE
PH, URINE, POC: 7 (ref 4.6–8)
PROTEIN,URINE, POC: NEGATIVE
PVR, POC: 162 CC
SPECIFIC GRAVITY, URINE, POC: 1.01 (ref 1–1.03)
URINALYSIS CLARITY, POC: NORMAL
URINALYSIS COLOR, POC: NORMAL
UROBILINOGEN, POC: NORMAL

## 2024-07-17 PROCEDURE — 51798 US URINE CAPACITY MEASURE: CPT | Performed by: NURSE PRACTITIONER

## 2024-07-17 PROCEDURE — G8427 DOCREV CUR MEDS BY ELIG CLIN: HCPCS | Performed by: NURSE PRACTITIONER

## 2024-07-17 PROCEDURE — 99214 OFFICE O/P EST MOD 30 MIN: CPT | Performed by: NURSE PRACTITIONER

## 2024-07-17 PROCEDURE — 3017F COLORECTAL CA SCREEN DOC REV: CPT | Performed by: NURSE PRACTITIONER

## 2024-07-17 PROCEDURE — G8417 CALC BMI ABV UP PARAM F/U: HCPCS | Performed by: NURSE PRACTITIONER

## 2024-07-17 PROCEDURE — 81003 URINALYSIS AUTO W/O SCOPE: CPT | Performed by: NURSE PRACTITIONER

## 2024-07-17 PROCEDURE — 1036F TOBACCO NON-USER: CPT | Performed by: NURSE PRACTITIONER

## 2024-07-17 RX ORDER — ALFUZOSIN HYDROCHLORIDE 10 MG/1
10 TABLET, EXTENDED RELEASE ORAL DAILY
Qty: 90 TABLET | Refills: 3 | Status: SHIPPED | OUTPATIENT
Start: 2024-07-17

## 2024-07-17 ASSESSMENT — ENCOUNTER SYMPTOMS
BACK PAIN: 1
NAUSEA: 0

## 2024-07-17 NOTE — PROGRESS NOTES
No acute distress, Awake, Alert, Oriented X 3, Gait normal  ABDOMEN: soft, non tender, non-distended, positive bowel sounds, no organomegaly, no palpable masses, no guarding, no rebound tenderness  SKIN: No rash, no erythema, no lacerations or abrasions, no ecchymosis  MUSCULOSKELETAL - MAEW, no edema     Assessment and Plan    ICD-10-CM    1. Benign prostatic hyperplasia without lower urinary tract symptoms  N40.0 AMB POC URINALYSIS DIP STICK AUTO W/O MICRO     AMB POC PVR, ELIGIO,POST-VOID RES,US,NON-IMAGING      2. Chronic prostatitis  N41.1 AMB POC URINALYSIS DIP STICK AUTO W/O MICRO        Prostatitis-  Add alfuzosin 10 mg p.o. nightly.  Patient has used tamsulosin in the past but it was difficult for him to swallow the capsule.  I have asked him to also finish out the amoxicillin.  I will see him back in 3 to 4 weeks for recheck.  UA and PVR will be done then.  He understands if symptoms worsen prior to that time please call the office.    BPH-  Worsening    May need cystoscopy if symptoms do not improve.    Orders Placed This Encounter    AMB POC URINALYSIS DIP STICK AUTO W/O MICRO    AMB POC PVR, ELIGIO,POST-VOID RES,US,NON-IMAGING    alfuzosin (UROXATRAL) 10 MG extended release tablet     Sig: Take 1 tablet by mouth daily     Dispense:  90 tablet     Refill:  3       Return in about 4 weeks (around 8/14/2024) for U/A, PVR and OV.    Total time for today's encounter including chart review, result   review, documentation and face to face encounter was 30 minutes.    NIMA Worrell    [facility]   [address] [place], [place] [address]  Phone: [phone]  Fax: [phone]     Dr. Medina was supervising physician today and approves plan of care.    Elements of this note have been dictated using speech recognition software.  Although reviewed, errors of speech recognition may have occurred.

## 2024-08-06 ENCOUNTER — APPOINTMENT (RX ONLY)
Dept: URBAN - METROPOLITAN AREA CLINIC 329 | Facility: CLINIC | Age: 64
Setting detail: DERMATOLOGY
End: 2024-08-06

## 2024-08-06 DIAGNOSIS — L72.8 OTHER FOLLICULAR CYSTS OF THE SKIN AND SUBCUTANEOUS TISSUE: ICD-10-CM

## 2024-08-06 DIAGNOSIS — D22 MELANOCYTIC NEVI: ICD-10-CM

## 2024-08-06 DIAGNOSIS — L81.4 OTHER MELANIN HYPERPIGMENTATION: ICD-10-CM

## 2024-08-06 DIAGNOSIS — L85.3 XEROSIS CUTIS: ICD-10-CM

## 2024-08-06 DIAGNOSIS — L20.89 OTHER ATOPIC DERMATITIS: ICD-10-CM | Status: INADEQUATELY CONTROLLED

## 2024-08-06 PROBLEM — D22.5 MELANOCYTIC NEVI OF TRUNK: Status: ACTIVE | Noted: 2024-08-06

## 2024-08-06 PROBLEM — D48.5 NEOPLASM OF UNCERTAIN BEHAVIOR OF SKIN: Status: ACTIVE | Noted: 2024-08-06

## 2024-08-06 PROCEDURE — ? SUNSCREEN TREATMENT REGIMEN

## 2024-08-06 PROCEDURE — ? SUNSCREEN RECOMMENDATIONS

## 2024-08-06 PROCEDURE — ? REFERRAL CORRESPONDENCE

## 2024-08-06 PROCEDURE — 11104 PUNCH BX SKIN SINGLE LESION: CPT

## 2024-08-06 PROCEDURE — ? TREATMENT REGIMEN

## 2024-08-06 PROCEDURE — ? PRESCRIPTION

## 2024-08-06 PROCEDURE — ? BIOPSY BY PUNCH METHOD

## 2024-08-06 PROCEDURE — ? PRESCRIPTION MEDICATION MANAGEMENT

## 2024-08-06 PROCEDURE — ? COUNSELING

## 2024-08-06 PROCEDURE — 99204 OFFICE O/P NEW MOD 45 MIN: CPT | Mod: 25

## 2024-08-06 RX ORDER — TRIAMCINOLONE ACETONIDE 1 MG/G
CREAM TOPICAL
Qty: 80 | Refills: 5 | Status: ERX | COMMUNITY
Start: 2024-08-06

## 2024-08-06 RX ADMIN — TRIAMCINOLONE ACETONIDE: 1 CREAM TOPICAL at 00:00

## 2024-08-06 ASSESSMENT — LOCATION DETAILED DESCRIPTION DERM
LOCATION DETAILED: LEFT MEDIAL SUPERIOR CHEST
LOCATION DETAILED: RIGHT CLAVICULAR NECK
LOCATION DETAILED: RIGHT INFERIOR MEDIAL UPPER BACK
LOCATION DETAILED: RIGHT THENAR EMINENCE
LOCATION DETAILED: LEFT SUPERIOR LATERAL UPPER BACK
LOCATION DETAILED: RIGHT PROXIMAL RADIAL DORSAL FOREARM
LOCATION DETAILED: LEFT ULNAR PALM
LOCATION DETAILED: RIGHT MEDIAL SUPERIOR CHEST
LOCATION DETAILED: RIGHT ULNAR PALM
LOCATION DETAILED: LEFT CLAVICULAR SKIN
LOCATION DETAILED: LEFT THENAR EMINENCE

## 2024-08-06 ASSESSMENT — LOCATION ZONE DERM
LOCATION ZONE: HAND
LOCATION ZONE: ARM
LOCATION ZONE: TRUNK
LOCATION ZONE: NECK

## 2024-08-06 ASSESSMENT — LOCATION SIMPLE DESCRIPTION DERM
LOCATION SIMPLE: RIGHT HAND
LOCATION SIMPLE: LEFT UPPER BACK
LOCATION SIMPLE: LEFT HAND
LOCATION SIMPLE: RIGHT FOREARM
LOCATION SIMPLE: RIGHT UPPER BACK
LOCATION SIMPLE: LEFT CLAVICULAR SKIN
LOCATION SIMPLE: CHEST
LOCATION SIMPLE: RIGHT ANTERIOR NECK

## 2024-08-06 NOTE — PROCEDURE: BIOPSY BY PUNCH METHOD
Detail Level: Detailed
Was A Bandage Applied: Yes
Punch Size In Mm: 8
Size Of Lesion In Cm (Optional): 0
Depth Of Punch Biopsy: dermis
Biopsy Type: H and E
Anesthesia Type: 1% lidocaine with epinephrine
Anesthesia Volume In Cc: 0.5
Hemostasis: None
Epidermal Sutures: 4-0 Prolene
Wound Care: Petrolatum
Dressing: bandage
Suture Removal: 10 days
Patient Will Remove Sutures At Home?: No
Lab: 6
Lab Facility: 3
Consent: Written consent was obtained and risks were reviewed including but not limited to scarring, infection, bleeding, scabbing, incomplete removal, nerve damage and allergy to anesthesia.
Post-Care Instructions: I reviewed with the patient in detail post-care instructions. Patient is to keep the biopsy site dry overnight, and then apply bacitracin twice daily until healed. Patient may apply hydrogen peroxide soaks to remove any crusting.
Home Suture Removal Text: Patient was provided a home suture removal kit and will remove their sutures at home.  If they have any questions or difficulties they will call the office.
Notification Instructions: Patient will be notified of biopsy results. However, patient instructed to call the office if not contacted within 2 weeks.
Billing Type: Third-Party Bill
Information: Selecting Yes will display possible errors in your note based on the variables you have selected. This validation is only offered as a suggestion for you. PLEASE NOTE THAT THE VALIDATION TEXT WILL BE REMOVED WHEN YOU FINALIZE YOUR NOTE. IF YOU WANT TO FAX A PRELIMINARY NOTE YOU WILL NEED TO TOGGLE THIS TO 'NO' IF YOU DO NOT WANT IT IN YOUR FAXED NOTE.

## 2024-08-06 NOTE — PROCEDURE: PRESCRIPTION MEDICATION MANAGEMENT
Render In Strict Bullet Format?: No
Detail Level: Zone
Initiate Treatment: Triamcinolone cream bid for 2 weeks then prn

## 2024-08-16 ENCOUNTER — RX ONLY (OUTPATIENT)
Age: 64
Setting detail: RX ONLY
End: 2024-08-16

## 2024-08-16 RX ORDER — MUPIROCIN 20 MG/G
OINTMENT TOPICAL
Qty: 22 | Refills: 0 | Status: ERX | COMMUNITY
Start: 2024-08-16

## 2024-08-30 ENCOUNTER — TELEPHONE (OUTPATIENT)
Dept: UROLOGY | Age: 64
End: 2024-08-30

## 2024-08-30 ENCOUNTER — OFFICE VISIT (OUTPATIENT)
Dept: UROLOGY | Age: 64
End: 2024-08-30
Payer: MEDICARE

## 2024-08-30 DIAGNOSIS — R39.9 LOWER URINARY TRACT SYMPTOMS (LUTS): ICD-10-CM

## 2024-08-30 DIAGNOSIS — R35.0 URINARY FREQUENCY: ICD-10-CM

## 2024-08-30 DIAGNOSIS — N40.0 BENIGN PROSTATIC HYPERPLASIA WITHOUT LOWER URINARY TRACT SYMPTOMS: Primary | ICD-10-CM

## 2024-08-30 LAB
BILIRUBIN, URINE, POC: NEGATIVE
BLOOD URINE, POC: NEGATIVE
GLUCOSE URINE, POC: NEGATIVE
KETONES, URINE, POC: NEGATIVE
LEUKOCYTE ESTERASE, URINE, POC: NEGATIVE
NITRITE, URINE, POC: NEGATIVE
PH, URINE, POC: 6.5 (ref 4.6–8)
PROTEIN,URINE, POC: NEGATIVE
PVR, POC: 272 CC
SPECIFIC GRAVITY, URINE, POC: 1.01 (ref 1–1.03)
URINALYSIS CLARITY, POC: NORMAL
URINALYSIS COLOR, POC: NORMAL
UROBILINOGEN, POC: NORMAL

## 2024-08-30 PROCEDURE — G8428 CUR MEDS NOT DOCUMENT: HCPCS | Performed by: NURSE PRACTITIONER

## 2024-08-30 PROCEDURE — G8417 CALC BMI ABV UP PARAM F/U: HCPCS | Performed by: NURSE PRACTITIONER

## 2024-08-30 PROCEDURE — 51798 US URINE CAPACITY MEASURE: CPT | Performed by: NURSE PRACTITIONER

## 2024-08-30 PROCEDURE — 3017F COLORECTAL CA SCREEN DOC REV: CPT | Performed by: NURSE PRACTITIONER

## 2024-08-30 PROCEDURE — 81003 URINALYSIS AUTO W/O SCOPE: CPT | Performed by: NURSE PRACTITIONER

## 2024-08-30 PROCEDURE — 99214 OFFICE O/P EST MOD 30 MIN: CPT | Performed by: NURSE PRACTITIONER

## 2024-08-30 PROCEDURE — 1036F TOBACCO NON-USER: CPT | Performed by: NURSE PRACTITIONER

## 2024-08-30 ASSESSMENT — ENCOUNTER SYMPTOMS
BACK PAIN: 0
NAUSEA: 0

## 2024-08-30 NOTE — PROGRESS NOTES
previously-now diet controlled- 8/2019  A1C 5.9 (patient reports)- does not check BS regularly-  hypo less than 80 (when taking metformin)    Dyslipidemia     Essential hypertension     managed with meds    Fibromyalgia     Hematuria, microscopic     Hypertrophy of prostate with urinary obstruction and other lower urinary tract symptoms (LUTS)     Hypogonadism, testicular     Irritable bowel syndrome     Obesity     Obesity (BMI 30.0-34.9) 01/21/2020    BMI 34.26    Obstructive sleep apnea on CPAP     uses CPAP    PONV (postoperative nausea and vomiting)     Prediabetes     PUD (peptic ulcer disease) 1970s    Symptomatic PVCs 12/14/2022    Ventral hernia      Past Surgical History:   Procedure Laterality Date    APPENDECTOMY  1976    CARDIAC CATHETERIZATION  02/19/2016    no blockages, no interventions    CHOLECYSTECTOMY  11/2012    COLONOSCOPY      COLONOSCOPY      COLONOSCOPY      DIAGNOSTIC CARDIAC CATH LAB PROCEDURE  02/09/2016    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    HERNIA REPAIR  07/2012    TONSILLECTOMY AND ADENOIDECTOMY  1974    UROLOGICAL SURGERY  1984    correction of right testicular torsion    UROLOGICAL SURGERY      varicocelectomy    VASECTOMY  2005    VEIN SURGERY  2005     Current Outpatient Medications   Medication Sig Dispense Refill    alfuzosin (UROXATRAL) 10 MG extended release tablet Take 1 tablet by mouth daily 90 tablet 3    Multiple Vitamins-Minerals (AIRBORNE PO) Take by mouth      Ascorbic Acid (VITAMIN C PO) Take by mouth      metoprolol succinate (TOPROL XL) 25 MG extended release tablet Take 1 tablet by mouth daily 90 tablet 3    tamsulosin (FLOMAX) 0.4 MG capsule Take 1 capsule by mouth nightly 90 capsule 1    lubiprostone (AMITIZA) 24 MCG capsule lubiprostone 24 mcg capsule   Take 1 capsule twice a day by oral route.      amitriptyline (ELAVIL) 25 MG tablet Take 1 tablet by mouth      losartan-hydroCHLOROthiazide (HYZAAR) 100-25 MG per tablet Take 1 tablet by mouth  daily      meclizine (ANTIVERT) 25 MG tablet Take by mouth 3 times daily as needed       No current facility-administered medications for this visit.     Allergies   Allergen Reactions    Pregabalin Other (See Comments)    Prednisone Other (See Comments)     Social History     Socioeconomic History    Marital status:      Spouse name: Not on file    Number of children: Not on file    Years of education: Not on file    Highest education level: Not on file   Occupational History    Not on file   Tobacco Use    Smoking status: Never    Smokeless tobacco: Never   Substance and Sexual Activity    Alcohol use: No    Drug use: No    Sexual activity: Not Currently     Partners: Female     Comment: Wife only   Other Topics Concern    Not on file   Social History Narrative    Not on file     Social Determinants of Health     Financial Resource Strain: Not on file   Food Insecurity: Not on file   Transportation Needs: Not on file   Physical Activity: Sufficiently Active (7/24/2023)    Received from [facility]    Physical Activity     Days of Exercise per Week: 7     Minutes of Exercise per Session: 120     Total Minutes of Exercise per Week: 840   Stress: Not on file   Social Connections: Unknown (3/19/2021)    Received from [facility]    Social Connections     Frequency of Communication with Friends and Family: Not asked     Frequency of Social Gatherings with Friends and Family: Not asked   Intimate Partner Violence: Unknown (3/19/2021)    Received from [facility]    Intimate Partner Violence     Fear of Current or Ex-Partner: Not asked     Emotionally Abused: Not asked     Physically Abused: Not asked     Sexually Abused: Not asked   Housing Stability: Not At Risk (3/9/2022)    Received from [facility]    Housing Stability     Was there a time when you did not have a steady place to sleep: Not asked     Worried that the place you are staying is

## 2024-11-01 ENCOUNTER — PROCEDURE VISIT (OUTPATIENT)
Dept: UROLOGY | Age: 64
End: 2024-11-01
Payer: MEDICARE

## 2024-11-01 DIAGNOSIS — R39.9 LOWER URINARY TRACT SYMPTOMS (LUTS): Primary | ICD-10-CM

## 2024-11-01 DIAGNOSIS — N40.1 BENIGN PROSTATIC HYPERPLASIA WITH URINARY FREQUENCY: ICD-10-CM

## 2024-11-01 DIAGNOSIS — R35.0 BENIGN PROSTATIC HYPERPLASIA WITH URINARY FREQUENCY: ICD-10-CM

## 2024-11-01 LAB
BILIRUBIN, URINE, POC: NEGATIVE
BLOOD URINE, POC: NEGATIVE
GLUCOSE URINE, POC: NEGATIVE MG/DL
KETONES, URINE, POC: NEGATIVE MG/DL
LEUKOCYTE ESTERASE, URINE, POC: NEGATIVE
NITRITE, URINE, POC: NEGATIVE
PH, URINE, POC: 6.5 (ref 4.6–8)
PROTEIN,URINE, POC: NEGATIVE MG/DL
SPECIFIC GRAVITY, URINE, POC: 1.01 (ref 1–1.03)
URINALYSIS CLARITY, POC: NORMAL
URINALYSIS COLOR, POC: NORMAL
UROBILINOGEN, POC: NORMAL MG/DL

## 2024-11-01 PROCEDURE — G8484 FLU IMMUNIZE NO ADMIN: HCPCS | Performed by: UROLOGY

## 2024-11-01 PROCEDURE — G8428 CUR MEDS NOT DOCUMENT: HCPCS | Performed by: UROLOGY

## 2024-11-01 PROCEDURE — 81003 URINALYSIS AUTO W/O SCOPE: CPT | Performed by: UROLOGY

## 2024-11-01 PROCEDURE — G8417 CALC BMI ABV UP PARAM F/U: HCPCS | Performed by: UROLOGY

## 2024-11-01 PROCEDURE — 99214 OFFICE O/P EST MOD 30 MIN: CPT | Performed by: UROLOGY

## 2024-11-01 PROCEDURE — 3017F COLORECTAL CA SCREEN DOC REV: CPT | Performed by: UROLOGY

## 2024-11-01 PROCEDURE — 52000 CYSTOURETHROSCOPY: CPT | Performed by: UROLOGY

## 2024-11-01 PROCEDURE — 1036F TOBACCO NON-USER: CPT | Performed by: UROLOGY

## 2024-11-01 RX ORDER — TAMSULOSIN HYDROCHLORIDE 0.4 MG/1
0.4 CAPSULE ORAL DAILY
Qty: 90 CAPSULE | Refills: 3 | Status: SHIPPED | OUTPATIENT
Start: 2024-11-01

## 2024-11-01 NOTE — PROGRESS NOTES
[facility]  [address] [place] [address]   [address]  [place] [address]  [phone]    Kale Avalos  : 1960         HPI   64 y.o., male who presents for cystoscopy for evaluation of refractory LUTS.     He has a history of BPH/LUTS refractory to medications who is s/p TURP on 20.  Returns for follow up today.      Seen 2022 and 2022 for prostatitis.  He was treated with 4 weeks cipro at that time with resolution of symptoms.      Flomax started 2023 for elevated  cc that went down to 86 cc per last visit. He decided to discontinue Flomax due to resolution of symptoms last visit.      Today, he reports that LUTS have worsened and now has weak stream / dysuria.  He restarrted alfluzosin 2024 with NP Henderson which has not helped.   Has been off flomax since 2023.      He does have a history of prostatitis in the past treated with elavil but he has been off of that > 1 year without issue.     Lab Results   Component Value Date    PSA 0.7 2024    PSA 1.0 2023    PSA 1.3 2022    PSA 1.4 2020           Past Medical History:   Diagnosis Date    Anxiety and depression     Attention deficit disorder     Chronic constipation     Chronic pain     fibromyalgia    Colon polyps ,     repeat colonoscopy every 5 yrs    Diabetes (HCC)     metformin previously-now diet controlled- 2019  A1C 5.9 (patient reports)- does not check BS regularly-  hypo less than 80 (when taking metformin)    Dyslipidemia     Essential hypertension     managed with meds    Fibromyalgia     Hematuria, microscopic     Hypertrophy of prostate with urinary obstruction and other lower urinary tract symptoms (LUTS)     Hypogonadism, testicular     Irritable bowel syndrome     Obesity     Obesity (BMI 30.0-34.9) 2020    BMI 34.26    Obstructive sleep apnea on CPAP     uses CPAP    PONV (postoperative nausea and vomiting)     Prediabetes     PUD (peptic ulcer disease) 1970s

## 2025-01-21 ENCOUNTER — OFFICE VISIT (OUTPATIENT)
Age: 65
End: 2025-01-21
Payer: MEDICARE

## 2025-01-21 VITALS
BODY MASS INDEX: 35.5 KG/M2 | SYSTOLIC BLOOD PRESSURE: 126 MMHG | HEART RATE: 62 BPM | HEIGHT: 70 IN | DIASTOLIC BLOOD PRESSURE: 76 MMHG | WEIGHT: 248 LBS

## 2025-01-21 DIAGNOSIS — I10 ESSENTIAL HYPERTENSION: ICD-10-CM

## 2025-01-21 DIAGNOSIS — I49.3 SYMPTOMATIC PVCS: Primary | ICD-10-CM

## 2025-01-21 PROCEDURE — 3074F SYST BP LT 130 MM HG: CPT | Performed by: INTERNAL MEDICINE

## 2025-01-21 PROCEDURE — 3078F DIAST BP <80 MM HG: CPT | Performed by: INTERNAL MEDICINE

## 2025-01-21 PROCEDURE — 99214 OFFICE O/P EST MOD 30 MIN: CPT | Performed by: INTERNAL MEDICINE

## 2025-01-21 PROCEDURE — G8427 DOCREV CUR MEDS BY ELIG CLIN: HCPCS | Performed by: INTERNAL MEDICINE

## 2025-01-21 PROCEDURE — 3017F COLORECTAL CA SCREEN DOC REV: CPT | Performed by: INTERNAL MEDICINE

## 2025-01-21 PROCEDURE — 1036F TOBACCO NON-USER: CPT | Performed by: INTERNAL MEDICINE

## 2025-01-21 PROCEDURE — G8417 CALC BMI ABV UP PARAM F/U: HCPCS | Performed by: INTERNAL MEDICINE

## 2025-01-21 RX ORDER — DAPAGLIFLOZIN 10 MG/1
1 TABLET, FILM COATED ORAL DAILY
COMMUNITY

## 2025-01-21 ASSESSMENT — ENCOUNTER SYMPTOMS
HEMATEMESIS: 0
DIARRHEA: 0
BOWEL INCONTINENCE: 0
SHORTNESS OF BREATH: 0
HEMATOCHEZIA: 0
COLOR CHANGE: 0
WHEEZING: 0
SPUTUM PRODUCTION: 0
ORTHOPNEA: 0
HOARSE VOICE: 0
ABDOMINAL PAIN: 0
BLURRED VISION: 0

## 2025-01-21 NOTE — PROGRESS NOTES
[facility]  [address] [place] [address], [address]  [place] [address]  PHONE: [phone]        25        NAME:  Kale Avalos  : 1960  MRN: [id number]       SUBJECTIVE:   Kale Avalos is a 64 y.o. male seen for a follow up visit regarding the following: Primary hypertension & PVC's.Historically,he reported chest pain and had a normal follow up Lexiscan in 2022.He returns for follow up.He describes an episode of fatigue which occurred at work ~ 1 month ago.He attributed symptoms to lack of sleep.Symptoms have not returned.He reports no chest pain,palpitations,SOB,or dizziness.He walks 2 miles/day without issues.    Chief Complaint   Patient presents with    Hypertension       HPI:    Hypertension  This is a chronic problem. The problem is unchanged. The problem is controlled. Associated symptoms include malaise/fatigue. Pertinent negatives include no anxiety, blurred vision, chest pain, headaches, neck pain, orthopnea, palpitations, peripheral edema, PND, shortness of breath or sweats.       Past Medical History, Past Surgical History, Family history, Social History, and Medications were all reviewed with the patient today and updated as necessary.         Current Outpatient Medications:     alfuzosin (UROXATRAL) 10 MG extended release tablet, Take 1 tablet by mouth daily, Disp: 90 tablet, Rfl: 3    Multiple Vitamins-Minerals (AIRBORNE PO), Take by mouth, Disp: , Rfl:     Ascorbic Acid (VITAMIN C PO), Take by mouth, Disp: , Rfl:     metoprolol succinate (TOPROL XL) 25 MG extended release tablet, Take 1 tablet by mouth daily, Disp: 90 tablet, Rfl: 3    tamsulosin (FLOMAX) 0.4 MG capsule, Take 1 capsule by mouth nightly, Disp: 90 capsule, Rfl: 1    lubiprostone (AMITIZA) 24 MCG capsule, lubiprostone 24 mcg capsule  Take 1 capsule twice a day by oral route., Disp: , Rfl:     amitriptyline (ELAVIL) 25 MG tablet, Take 1 tablet by mouth, Disp: , Rfl:     losartan-hydroCHLOROthiazide

## 2025-01-31 ENCOUNTER — OFFICE VISIT (OUTPATIENT)
Dept: UROLOGY | Age: 65
End: 2025-01-31
Payer: MEDICARE

## 2025-01-31 DIAGNOSIS — N41.1 CHRONIC PROSTATITIS: ICD-10-CM

## 2025-01-31 DIAGNOSIS — R35.0 URINARY FREQUENCY: ICD-10-CM

## 2025-01-31 DIAGNOSIS — R35.0 BENIGN PROSTATIC HYPERPLASIA WITH URINARY FREQUENCY: Primary | ICD-10-CM

## 2025-01-31 DIAGNOSIS — N40.1 BENIGN PROSTATIC HYPERPLASIA WITH URINARY FREQUENCY: Primary | ICD-10-CM

## 2025-01-31 LAB
BILIRUBIN, URINE, POC: NEGATIVE
BLOOD URINE, POC: NORMAL
GLUCOSE URINE, POC: 500 MG/DL
KETONES, URINE, POC: NEGATIVE MG/DL
LEUKOCYTE ESTERASE, URINE, POC: NEGATIVE
NITRITE, URINE, POC: NEGATIVE
PH, URINE, POC: 6 (ref 4.6–8)
PROTEIN,URINE, POC: NEGATIVE MG/DL
PVR, POC: 208 CC
SPECIFIC GRAVITY, URINE, POC: 1.01 (ref 1–1.03)
URINALYSIS CLARITY, POC: NORMAL
URINALYSIS COLOR, POC: NORMAL
UROBILINOGEN, POC: NORMAL MG/DL

## 2025-01-31 PROCEDURE — 51798 US URINE CAPACITY MEASURE: CPT | Performed by: UROLOGY

## 2025-01-31 PROCEDURE — G8427 DOCREV CUR MEDS BY ELIG CLIN: HCPCS | Performed by: UROLOGY

## 2025-01-31 PROCEDURE — 81003 URINALYSIS AUTO W/O SCOPE: CPT | Performed by: UROLOGY

## 2025-01-31 PROCEDURE — 3017F COLORECTAL CA SCREEN DOC REV: CPT | Performed by: UROLOGY

## 2025-01-31 PROCEDURE — 99214 OFFICE O/P EST MOD 30 MIN: CPT | Performed by: UROLOGY

## 2025-01-31 PROCEDURE — G8417 CALC BMI ABV UP PARAM F/U: HCPCS | Performed by: UROLOGY

## 2025-01-31 PROCEDURE — 1036F TOBACCO NON-USER: CPT | Performed by: UROLOGY

## 2025-01-31 ASSESSMENT — ENCOUNTER SYMPTOMS
CONSTIPATION: 0
DIARRHEA: 0
EYE DISCHARGE: 0
SKIN LESIONS: 0
HEARTBURN: 0
INDIGESTION: 0
ABDOMINAL PAIN: 0
BLOOD IN STOOL: 0
WHEEZING: 0
VOMITING: 0
EYE PAIN: 0
BACK PAIN: 0
SHORTNESS OF BREATH: 0
NAUSEA: 0
COUGH: 0

## 2025-02-01 NOTE — PROGRESS NOTES
[facility]  [address] [place] [address]   [address]  [place] [address]  [phone]    Kale Avalos  : 1960    Chief Complaint   Patient presents with    Follow-up     HPI     Kale Avalos is a 64 y.o. male    History of Present Illness  The patient is a 64-year-old gentleman with a history of lower urinary tract symptoms and prostatitis. He had a TURP in 2020. He was seen in 2024, and his Flomax was restarted at that time. He returns today for a follow-up to assess his current status.    He reports an improvement in his condition with the use of Flomax, finding it more effective than alfuzosin. He does not experience any issues related to infections or urinary leakage. However, he notes a need to urinate immediately after our discussions, a pattern observed during his last two visits. He also mentions a strong urge to urinate during his daily walks, particularly between the 2.25 and 2.5-mile marks.  Has nocturia q2h at night but not bothersome to him at this time.     MEDICATIONS  Current: Flomax  Past: alfuzosin, finasteride    Lab Results   Component Value Date    PSA 0.7 2024    PSA 1.0 2023    PSA 1.3 2022    PSA 1.4 2020           Past Medical History:   Diagnosis Date    Anxiety and depression     Attention deficit disorder     Chronic constipation     Chronic pain     fibromyalgia    Colon polyps ,     repeat colonoscopy every 5 yrs    Diabetes (HCC)     metformin previously-now diet controlled- 2019  A1C 5.9 (patient reports)- does not check BS regularly-  hypo less than 80 (when taking metformin)    Dyslipidemia     Essential hypertension     managed with meds    Fibromyalgia     Hematuria, microscopic     Hypertrophy of prostate with urinary obstruction and other lower urinary tract symptoms (LUTS)     Hypogonadism, testicular     Irritable bowel syndrome     Obesity     Obesity (BMI 30.0-34.9) 2020    BMI 34.26

## 2025-02-09 ENCOUNTER — HOSPITAL ENCOUNTER (EMERGENCY)
Age: 65
Discharge: HOME | End: 2025-02-09
Attending: EMERGENCY MEDICINE
Payer: MEDICARE

## 2025-02-09 VITALS
DIASTOLIC BLOOD PRESSURE: 88 MMHG | HEART RATE: 69 BPM | SYSTOLIC BLOOD PRESSURE: 176 MMHG | TEMPERATURE: 97.6 F | RESPIRATION RATE: 18 BRPM | OXYGEN SATURATION: 100 %

## 2025-02-09 DIAGNOSIS — S01.111A LACERATION OF RIGHT EYEBROW, INITIAL ENCOUNTER: Primary | ICD-10-CM

## 2025-02-09 PROCEDURE — 6360000002 HC RX W HCPCS

## 2025-02-09 PROCEDURE — 99282 EMERGENCY DEPT VISIT SF MDM: CPT

## 2025-02-09 PROCEDURE — 12013 RPR F/E/E/N/L/M 2.6-5.0 CM: CPT

## 2025-02-09 RX ORDER — LIDOCAINE HYDROCHLORIDE 10 MG/ML
5 INJECTION, SOLUTION INFILTRATION; PERINEURAL
Status: COMPLETED | OUTPATIENT
Start: 2025-02-09 | End: 2025-02-09

## 2025-02-09 RX ORDER — LIDOCAINE HYDROCHLORIDE 10 MG/ML
INJECTION, SOLUTION INFILTRATION; PERINEURAL
Status: COMPLETED
Start: 2025-02-09 | End: 2025-02-09

## 2025-02-09 RX ADMIN — LIDOCAINE HYDROCHLORIDE 5 ML: 10 INJECTION, SOLUTION INFILTRATION; PERINEURAL at 04:56

## 2025-02-09 ASSESSMENT — PAIN - FUNCTIONAL ASSESSMENT: PAIN_FUNCTIONAL_ASSESSMENT: 0-10

## 2025-02-09 ASSESSMENT — PAIN SCALES - GENERAL: PAINLEVEL_OUTOF10: 4

## 2025-02-09 NOTE — ED PROVIDER NOTES
Emergency Department Provider Note       PCP: Rosa Isela Mercado MD   Age: 64 y.o.   Sex: male     DISPOSITION Decision To Discharge 02/09/2025 04:48:19 AM    ICD-10-CM    1. Laceration of right eyebrow, initial encounter  S01.111A           Medical Decision Making     Patient comes to the ED for evaluation of a right eyebrow laceration.  Patient states he had a bad dream, she then rolled out of his bed, striking his right eyebrow on the glass portion of his nightstand.  Nightstand did not break.  Patient denies LOC.  He denies being anticoagulated.  Patient with a jagged laceration to lateral right eyebrow.  Patient is ambulatory without difficulty.    Laceration irrigated and repaired as in procedure note.  Patient stable for DC home.  Strict return precautions discussed.     1 acute, uncomplicated illness or injury.    Over the counter drug management performed.  Prescription drug management performed.  Shared medical decision making was utilized in creating the patients health plan today.    I reviewed external records: provider visit note from PCP.         History     Patient comes to the ED for evaluation of a right eyebrow laceration.  Patient states he had a bad dream, she then rolled out of his bed, striking his right eyebrow on the glass portion of his nightstand.  Nightstand did not break.  Patient denies LOC.  He denies being anticoagulated.  Patient with a jagged laceration to lateral right eyebrow.  Patient is ambulatory without difficulty.    The history is provided by the patient, a relative and medical records. No  was used.     Physical Exam     Vitals signs and nursing note reviewed:  Vitals:    02/09/25 0411   BP: (!) 176/88   Pulse: 69   Resp: 18   Temp: 97.6 °F (36.4 °C)   TempSrc: Oral   SpO2: 100%      Physical Exam  Vitals and nursing note reviewed.   Constitutional:       General: He is not in acute distress.     Appearance: Normal appearance. He is obese. He is not

## 2025-02-09 NOTE — ED TRIAGE NOTES
Patient presents to ER for a head injury/laceration. Patient fell out of bed and cut right eyebrow against his nightstand. Patient denies LOC and blood thinners. Bleeding controlled at this time.

## 2025-02-09 NOTE — DISCHARGE INSTRUCTIONS
You will need a wound evaluation in 5 days to see if the sutures are ready to come out.  If you have a headache with vomiting, increased swelling, uncontrollable bleeding, or any other concerning symptoms, please return to the ER immediately.

## 2025-04-18 ENCOUNTER — TELEPHONE (OUTPATIENT)
Dept: UROLOGY | Age: 65
End: 2025-04-18

## 2025-04-18 NOTE — TELEPHONE ENCOUNTER
Pt LVM stating his work schedule changed. Pt now works AM and off PM. Reschedule pt's 04/24/2025 BD appt time to 3PM and 05/02/225 OV to 04/30/2025 at 3PM.

## 2025-04-24 ENCOUNTER — TELEPHONE (OUTPATIENT)
Dept: UROLOGY | Age: 65
End: 2025-04-24

## 2025-06-03 ENCOUNTER — OFFICE VISIT (OUTPATIENT)
Dept: UROLOGY | Age: 65
End: 2025-06-03
Payer: MEDICARE

## 2025-06-03 DIAGNOSIS — R33.9 INCOMPLETE EMPTYING OF BLADDER: ICD-10-CM

## 2025-06-03 DIAGNOSIS — N45.1 EPIDIDYMITIS: Primary | ICD-10-CM

## 2025-06-03 DIAGNOSIS — N40.1 BENIGN PROSTATIC HYPERPLASIA WITH URINARY FREQUENCY: ICD-10-CM

## 2025-06-03 DIAGNOSIS — R35.0 BENIGN PROSTATIC HYPERPLASIA WITH URINARY FREQUENCY: ICD-10-CM

## 2025-06-03 PROCEDURE — G8417 CALC BMI ABV UP PARAM F/U: HCPCS | Performed by: NURSE PRACTITIONER

## 2025-06-03 PROCEDURE — 99214 OFFICE O/P EST MOD 30 MIN: CPT | Performed by: NURSE PRACTITIONER

## 2025-06-03 PROCEDURE — 96372 THER/PROPH/DIAG INJ SC/IM: CPT | Performed by: NURSE PRACTITIONER

## 2025-06-03 PROCEDURE — 3017F COLORECTAL CA SCREEN DOC REV: CPT | Performed by: NURSE PRACTITIONER

## 2025-06-03 PROCEDURE — 1036F TOBACCO NON-USER: CPT | Performed by: NURSE PRACTITIONER

## 2025-06-03 PROCEDURE — G8428 CUR MEDS NOT DOCUMENT: HCPCS | Performed by: NURSE PRACTITIONER

## 2025-06-03 RX ORDER — HYDROCODONE BITARTRATE AND ACETAMINOPHEN 5; 325 MG/1; MG/1
1 TABLET ORAL EVERY 4 HOURS PRN
Qty: 18 TABLET | Refills: 0 | Status: SHIPPED | OUTPATIENT
Start: 2025-06-03 | End: 2025-06-06

## 2025-06-03 RX ORDER — CIPROFLOXACIN 500 MG/5ML
KIT ORAL
COMMUNITY
Start: 2025-05-29 | End: 2025-06-03 | Stop reason: SDUPTHER

## 2025-06-03 RX ORDER — CEFTRIAXONE 1 G/1
1000 INJECTION, POWDER, FOR SOLUTION INTRAMUSCULAR; INTRAVENOUS ONCE
Status: COMPLETED | OUTPATIENT
Start: 2025-06-03 | End: 2025-06-03

## 2025-06-03 RX ORDER — CIPROFLOXACIN 500 MG/5ML
500 KIT ORAL 2 TIMES DAILY
Qty: 70 ML | Refills: 0 | Status: SHIPPED | OUTPATIENT
Start: 2025-06-03

## 2025-06-03 RX ADMIN — CEFTRIAXONE 1000 MG: 1 INJECTION, POWDER, FOR SOLUTION INTRAMUSCULAR; INTRAVENOUS at 09:24

## 2025-06-03 ASSESSMENT — ENCOUNTER SYMPTOMS
BACK PAIN: 0
NAUSEA: 0

## 2025-06-03 NOTE — ADDENDUM NOTE
Addended by: MARIA ISABEL NORRIS on: 6/3/2025 09:25 AM     Modules accepted: Orders, Level of Service

## 2025-06-26 ENCOUNTER — OFFICE VISIT (OUTPATIENT)
Dept: UROLOGY | Age: 65
End: 2025-06-26

## 2025-06-26 DIAGNOSIS — N40.1 BENIGN PROSTATIC HYPERPLASIA WITH URINARY FREQUENCY: ICD-10-CM

## 2025-06-26 DIAGNOSIS — R35.0 BENIGN PROSTATIC HYPERPLASIA WITH URINARY FREQUENCY: ICD-10-CM

## 2025-06-26 DIAGNOSIS — R33.9 INCOMPLETE EMPTYING OF BLADDER: ICD-10-CM

## 2025-06-26 DIAGNOSIS — N45.1 EPIDIDYMITIS: Primary | ICD-10-CM

## 2025-06-26 LAB — PVR, POC: 233 CC

## 2025-06-26 RX ORDER — AMOXICILLIN 500 MG/1
CAPSULE ORAL
COMMUNITY
Start: 2025-06-24

## 2025-06-26 RX ORDER — NAPROXEN 500 MG/1
TABLET ORAL
COMMUNITY
Start: 2025-06-24

## 2025-06-26 ASSESSMENT — ENCOUNTER SYMPTOMS
NAUSEA: 0
BACK PAIN: 0

## 2025-06-26 NOTE — PROGRESS NOTES
[facility]  [address] [place] [address]  [phone]          Kale Avalos  : 1960    Chief Complaint   Patient presents with    Follow-up          HPI     Kale Avalos is a 65 y.o. male returns today for follow-up on epididymitis.  He was seen 3 weeks ago and at that time had right epididymitis.  We gave him an injection of Rocephin here in the office and sent him out with Cipro.  He is back today for follow-up.  He says he is better.  Not having any significant tenderness.  He does however still have some swelling.  He is voiding sufficiently.  However PVR today reveals 233 mL.  He continues the Flomax as prescribed.  Urine is clear.    History as below ( copied from previous note)  History of lower urinary tract symptoms and prostatitis. He had a TURP in 2020. He was seen in 2024, and his Flomax was restarted at that time. He returns today for a follow-up to assess his current status.     He reports an improvement in his condition with the use of Flomax, finding it more effective than alfuzosin. He does not experience any issues related to infections or urinary leakage. However, he notes a need to urinate immediately after our discussions, a pattern observed during his last two visits. He also mentions a strong urge to urinate during his daily walks, particularly between the 2.25 and 2.5-mile marks.  Has nocturia q2h at night but not bothersome to him at this time.     Past Medical History:   Diagnosis Date    Anxiety and depression     Attention deficit disorder     Chronic constipation     Chronic pain     fibromyalgia    Colon polyps ,     repeat colonoscopy every 5 yrs    Diabetes (HCC)     metformin previously-now diet controlled- 2019  A1C 5.9 (patient reports)- does not check BS regularly-  hypo less than 80 (when taking metformin)    Dyslipidemia     Essential hypertension     managed with meds    Fibromyalgia     Hematuria,

## 2025-07-05 ENCOUNTER — HOSPITAL ENCOUNTER (EMERGENCY)
Age: 65
Discharge: HOME | End: 2025-07-05
Payer: MEDICARE

## 2025-07-05 VITALS
WEIGHT: 248 LBS | SYSTOLIC BLOOD PRESSURE: 167 MMHG | HEART RATE: 70 BPM | BODY MASS INDEX: 35.5 KG/M2 | DIASTOLIC BLOOD PRESSURE: 80 MMHG | RESPIRATION RATE: 16 BRPM | HEIGHT: 70 IN | TEMPERATURE: 98.5 F | OXYGEN SATURATION: 96 %

## 2025-07-05 DIAGNOSIS — H81.10 BENIGN PAROXYSMAL POSITIONAL VERTIGO, UNSPECIFIED LATERALITY: Primary | ICD-10-CM

## 2025-07-05 LAB
ALBUMIN SERPL-MCNC: 3.8 G/DL (ref 3.2–4.6)
ALBUMIN/GLOB SERPL: 1.2 (ref 1–1.9)
ALP SERPL-CCNC: 107 U/L (ref 40–129)
ALT SERPL-CCNC: 20 U/L (ref 8–55)
ANION GAP SERPL CALC-SCNC: 15 MMOL/L (ref 7–16)
AST SERPL-CCNC: 22 U/L (ref 15–37)
BASOPHILS # BLD: 0.02 K/UL (ref 0–0.2)
BASOPHILS NFR BLD: 0.3 % (ref 0–2)
BILIRUB SERPL-MCNC: 0.5 MG/DL (ref 0–1.2)
BUN SERPL-MCNC: 25 MG/DL (ref 8–23)
CALCIUM SERPL-MCNC: 8.9 MG/DL (ref 8.8–10.2)
CHLORIDE SERPL-SCNC: 101 MMOL/L (ref 98–107)
CO2 SERPL-SCNC: 24 MMOL/L (ref 20–29)
CREAT SERPL-MCNC: 1.01 MG/DL (ref 0.8–1.3)
DIFFERENTIAL METHOD BLD: ABNORMAL
EOSINOPHIL # BLD: 0.06 K/UL (ref 0–0.8)
EOSINOPHIL NFR BLD: 0.8 % (ref 0.5–7.8)
ERYTHROCYTE [DISTWIDTH] IN BLOOD BY AUTOMATED COUNT: 12.7 % (ref 11.9–14.6)
GLOBULIN SER CALC-MCNC: 3.2 G/DL (ref 2.3–3.5)
GLUCOSE SERPL-MCNC: 140 MG/DL (ref 70–99)
HCT VFR BLD AUTO: 43 % (ref 41.1–50.3)
HGB BLD-MCNC: 14.7 G/DL (ref 13.6–17.2)
IMM GRANULOCYTES # BLD AUTO: 0.02 K/UL (ref 0–0.5)
IMM GRANULOCYTES NFR BLD AUTO: 0.3 % (ref 0–5)
LIPASE SERPL-CCNC: 29 U/L (ref 13–60)
LYMPHOCYTES # BLD: 1.67 K/UL (ref 0.5–4.6)
LYMPHOCYTES NFR BLD: 20.9 % (ref 13–44)
MCH RBC QN AUTO: 28.4 PG (ref 26.1–32.9)
MCHC RBC AUTO-ENTMCNC: 34.2 G/DL (ref 31.4–35)
MCV RBC AUTO: 83.2 FL (ref 82–102)
MONOCYTES # BLD: 0.51 K/UL (ref 0.1–1.3)
MONOCYTES NFR BLD: 6.4 % (ref 4–12)
NEUTS SEG # BLD: 5.72 K/UL (ref 1.7–8.2)
NEUTS SEG NFR BLD: 71.3 % (ref 43–78)
NRBC # BLD: 0 K/UL (ref 0–0.2)
PLATELET # BLD AUTO: 146 K/UL (ref 150–450)
PMV BLD AUTO: 10.1 FL (ref 9.4–12.3)
POTASSIUM SERPL-SCNC: 3.3 MMOL/L (ref 3.5–5.1)
PROT SERPL-MCNC: 7 G/DL (ref 6.3–8.2)
RBC # BLD AUTO: 5.17 M/UL (ref 4.23–5.6)
SODIUM SERPL-SCNC: 140 MMOL/L (ref 136–145)
WBC # BLD AUTO: 8 K/UL (ref 4.3–11.1)

## 2025-07-05 PROCEDURE — 96374 THER/PROPH/DIAG INJ IV PUSH: CPT

## 2025-07-05 PROCEDURE — 6360000002 HC RX W HCPCS: Performed by: PHYSICIAN ASSISTANT

## 2025-07-05 PROCEDURE — 83690 ASSAY OF LIPASE: CPT

## 2025-07-05 PROCEDURE — 6370000000 HC RX 637 (ALT 250 FOR IP): Performed by: PHYSICIAN ASSISTANT

## 2025-07-05 PROCEDURE — 85025 COMPLETE CBC W/AUTO DIFF WBC: CPT

## 2025-07-05 PROCEDURE — 99284 EMERGENCY DEPT VISIT MOD MDM: CPT

## 2025-07-05 PROCEDURE — 80053 COMPREHEN METABOLIC PANEL: CPT

## 2025-07-05 RX ORDER — ONDANSETRON 4 MG/1
4 TABLET, ORALLY DISINTEGRATING ORAL 3 TIMES DAILY PRN
Qty: 21 TABLET | Refills: 0 | Status: SHIPPED | OUTPATIENT
Start: 2025-07-05

## 2025-07-05 RX ORDER — ONDANSETRON 2 MG/ML
4 INJECTION INTRAMUSCULAR; INTRAVENOUS
Status: COMPLETED | OUTPATIENT
Start: 2025-07-05 | End: 2025-07-05

## 2025-07-05 RX ORDER — MECLIZINE HYDROCHLORIDE 25 MG/1
25 TABLET ORAL 3 TIMES DAILY PRN
Qty: 15 TABLET | Refills: 0 | Status: SHIPPED | OUTPATIENT
Start: 2025-07-05 | End: 2025-07-15

## 2025-07-05 RX ORDER — MECLIZINE HYDROCHLORIDE 25 MG/1
25 TABLET ORAL
Status: COMPLETED | OUTPATIENT
Start: 2025-07-05 | End: 2025-07-05

## 2025-07-05 RX ADMIN — MECLIZINE HYDROCHLORIDE 25 MG: 25 TABLET ORAL at 10:19

## 2025-07-05 RX ADMIN — ONDANSETRON 4 MG: 2 INJECTION, SOLUTION INTRAMUSCULAR; INTRAVENOUS at 10:17

## 2025-07-05 ASSESSMENT — LIFESTYLE VARIABLES
HOW MANY STANDARD DRINKS CONTAINING ALCOHOL DO YOU HAVE ON A TYPICAL DAY: PATIENT DOES NOT DRINK
HOW OFTEN DO YOU HAVE A DRINK CONTAINING ALCOHOL: NEVER

## 2025-07-05 NOTE — DISCHARGE INSTRUCTIONS
DISCHARGE INSTRUCTIONS  Thanks for coming in to see us at the [facility] Emergency Department today. It was a pleasure to care for you, and we truly hope you're feeling better soon.  Right now, we didn’t find anything life-threatening causing your symptoms, which is great news. That said, it’s still possible for something more serious to develop later on. If your symptoms get worse or anything new comes up, please don’t wait--come back in and let us take another look. Ignoring symptoms could lead to serious complications, long-term issues, or in rare cases, even life-threatening situations.    Overall today on your examination everything was normal.  I did not feel you needed any blood work or further imaging.  We did give you Zofran and meclizine which did symptomatically improve your symptoms.  We did refer you to neurology.  Please follow-up with neurology moving forward.  Please return for any worsening symptoms of weakness strokelike or heart attack leg symptoms, severe headaches uncontrolled or other concerning symptoms to you.    (BENIGN PAROXYSMAL POSITIONAL VERTIGO - BPPV): An inner ear condition caused by displaced calcium crystals in the semicircular canals, leading to brief episodes of vertigo triggered by head movements.  Instructions:  Avoid driving, biking, or climbing until symptoms resolve  Perform the Epley maneuver as directed to relieve symptoms  Keep floors clear to reduce fall risk during vertigo episodes  Rise slowly from bed or sitting positions to avoid sudden dizziness  Track symptoms; maintain follow-up as advised  Return Precautions:  New or worsening nausea, vomiting, headache, hearing loss, or ringing in ears  Vertigo lasting longer than 1 minute or not improving after maneuvers  Signs of stroke: sudden weakness, speech difficulty, facial droop  Chest pain, palpitations, or symptoms suggesting a heart issue  No improvement after several days or worsening balance issues    When to Get Help

## 2025-07-05 NOTE — ED PROVIDER NOTES
Emergency Department Provider Note       [facility] [facility]   PCP: Rosa Isela Mercado MD   Age: 65 y.o.   Sex: male     DISPOSITION Decision To Discharge 07/05/2025 12:08:42 PM    ICD-10-CM    1. Benign paroxysmal positional vertigo, unspecified laterality  H81.10 [facility]          Medical Decision Making     Kale Avalos is a 65 y.o. male presents with vertigo. .  Working list of differentials includes, but is not limited to Dysrhythmia, vasovagal syncope, orthostatic syncope, stroke, intracranial hemorrhage, seizure, PE, BPPV etc.    On exam he had any complete and intact neuroexam unremarkable for any stroke chest pain PE or other concerning etiologies at that nature.  His hints exam was completely normal.  I did do this despite him having no nystagmus.  I did give him meclizine and Zofran which almost completely resolved his symptoms while in the ED today.  I did give him referral to neurology for outpatient follow-up with a prescription for meclizine and Zofran.  No evidence of any syncopal event concerning for orthostatic syncope or vasovagal.  Again he had no chest pain palpitations concerning for dysrhythmia or heart etiology.    Kale Avalos appeared clinically stable.  In my clinical judgment, the working diagnosis of BPPV is appropriate and supported by today's evaluation.  He was discharged home in stable condition.  We did discuss  return precautions. These were both verbally discussed and given to him in his discharge paperwork.  He was counseled on the diagnosis, treatment options, expected course, and any potential red flag symptoms.  All questions were addressed. The risks and benefits of the plan were discussed in plain language. Mr.. Henley verbalized understanding and agreement with the disposition.    ED Course as of 07/05/25 1232   Sat Jul 05, 2025   1129 Checked in with the patient.  Lab work relatively unremarkable.  He is feeling much better in terms  1.2 1.0 - 1.9           No orders to display      NIH Stroke Scale  Interval: Baseline  Level of Consciousness (1a): Alert  LOC Questions (1b): Answers both correctly  LOC Commands (1c): Performs both tasks correctly  Best Gaze (2): Normal  Visual (3): No visual loss  Facial Palsy (4): Normal symmetrical movement  Motor Arm, Left (5a): No drift  Motor Arm, Right (5b): No drift  Motor Leg, Left (6a): No drift  Motor Leg, Right (6b): No drift  Limb Ataxia (7): Absent  Sensory (8): Normal  Best Language (9): No aphasia  Dysarthria (10): Normal  Extinction and Inattention (11): No abnormality  Total: 0         No results for input(s): \"COVID19\" in the last 72 hours.     Voice dictation software was used during the making of this note.  This software is not perfect and grammatical and other typographical errors may be present.  This note has not been completely proofread for errors.     Boby Camarena PA-C  07/05/25 [id number]

## 2025-07-05 NOTE — ED TRIAGE NOTES
Pt arrives via [facility] coming from home c/o N/V/Dizziness that started at 8am. Pt vomiting in a bag at time of triage.

## 2025-08-27 ENCOUNTER — OFFICE VISIT (OUTPATIENT)
Dept: UROLOGY | Age: 65
End: 2025-08-27
Payer: MEDICARE

## 2025-08-27 DIAGNOSIS — N45.1 EPIDIDYMITIS: Primary | ICD-10-CM

## 2025-08-27 DIAGNOSIS — N40.1 BENIGN PROSTATIC HYPERPLASIA WITH URINARY FREQUENCY: ICD-10-CM

## 2025-08-27 DIAGNOSIS — R33.9 INCOMPLETE EMPTYING OF BLADDER: ICD-10-CM

## 2025-08-27 DIAGNOSIS — R35.0 BENIGN PROSTATIC HYPERPLASIA WITH URINARY FREQUENCY: ICD-10-CM

## 2025-08-27 LAB
BILIRUBIN, URINE, POC: NEGATIVE
BLOOD URINE, POC: NEGATIVE
GLUCOSE URINE, POC: >=1000 MG/DL
KETONES, URINE, POC: NORMAL MG/DL
LEUKOCYTE ESTERASE, URINE, POC: NEGATIVE
NITRITE, URINE, POC: NEGATIVE
PH, URINE, POC: 7 (ref 4.6–8)
PROTEIN,URINE, POC: NEGATIVE MG/DL
PVR, POC: 84 CC
SPECIFIC GRAVITY, URINE, POC: 1.01 (ref 1–1.03)
URINALYSIS CLARITY, POC: NORMAL
URINALYSIS COLOR, POC: NORMAL
UROBILINOGEN, POC: NORMAL MG/DL

## 2025-08-27 PROCEDURE — 3017F COLORECTAL CA SCREEN DOC REV: CPT | Performed by: UROLOGY

## 2025-08-27 PROCEDURE — G8417 CALC BMI ABV UP PARAM F/U: HCPCS | Performed by: UROLOGY

## 2025-08-27 PROCEDURE — 81003 URINALYSIS AUTO W/O SCOPE: CPT | Performed by: UROLOGY

## 2025-08-27 PROCEDURE — 51798 US URINE CAPACITY MEASURE: CPT | Performed by: UROLOGY

## 2025-08-27 PROCEDURE — 1036F TOBACCO NON-USER: CPT | Performed by: UROLOGY

## 2025-08-27 PROCEDURE — G8427 DOCREV CUR MEDS BY ELIG CLIN: HCPCS | Performed by: UROLOGY

## 2025-08-27 PROCEDURE — 99214 OFFICE O/P EST MOD 30 MIN: CPT | Performed by: UROLOGY

## 2025-08-27 RX ORDER — CELECOXIB 200 MG/1
200 CAPSULE ORAL DAILY
Qty: 60 CAPSULE | Refills: 3 | Status: SHIPPED | OUTPATIENT
Start: 2025-08-27

## 2025-08-27 ASSESSMENT — ENCOUNTER SYMPTOMS
EYE PAIN: 0
CONSTIPATION: 0
BACK PAIN: 0
INDIGESTION: 0
SKIN LESIONS: 0
COUGH: 0
SHORTNESS OF BREATH: 0
NAUSEA: 0
DIARRHEA: 0
BLOOD IN STOOL: 0
HEARTBURN: 0
VOMITING: 0
WHEEZING: 0
ABDOMINAL PAIN: 0
EYE DISCHARGE: 0

## (undated) DEVICE — DEVICE STBL AD TRICOT ANCHR PD FOR 3 W F CATH STATLOK

## (undated) DEVICE — CATHETER URETH 24FR BLLN 30CC STD LTX 3 W TWO OPP DRNGE EYE

## (undated) DEVICE — CONTAINER SPEC FRMLN 120ML --

## (undated) DEVICE — HF-RESECTION ELECTRODE PLASMALOOP LOOP, MEDIUM, 24 FR., 12°-30°, PK TURIS: Brand: OLYMPUS

## (undated) DEVICE — SOLUTION IRRIG 1000ML H2O STRL BLT

## (undated) DEVICE — TURP TURB: Brand: MEDLINE INDUSTRIES, INC.